# Patient Record
Sex: MALE | Race: WHITE | NOT HISPANIC OR LATINO | URBAN - METROPOLITAN AREA
[De-identification: names, ages, dates, MRNs, and addresses within clinical notes are randomized per-mention and may not be internally consistent; named-entity substitution may affect disease eponyms.]

---

## 2017-03-23 ENCOUNTER — OFFICE VISIT (OUTPATIENT)
Dept: FAMILY MEDICINE | Facility: CLINIC | Age: 22
End: 2017-03-23
Payer: COMMERCIAL

## 2017-03-23 VITALS
WEIGHT: 203.2 LBS | SYSTOLIC BLOOD PRESSURE: 140 MMHG | HEART RATE: 87 BPM | BODY MASS INDEX: 26.08 KG/M2 | TEMPERATURE: 97.5 F | HEIGHT: 74 IN | DIASTOLIC BLOOD PRESSURE: 93 MMHG | OXYGEN SATURATION: 98 %

## 2017-03-23 DIAGNOSIS — N34.1 NONGONOCOCCAL URETHRITIS DUE TO UREAPLASMA UREALYTICUM: ICD-10-CM

## 2017-03-23 DIAGNOSIS — F41.1 GAD (GENERALIZED ANXIETY DISORDER): Primary | ICD-10-CM

## 2017-03-23 DIAGNOSIS — A49.3 NONGONOCOCCAL URETHRITIS DUE TO UREAPLASMA UREALYTICUM: ICD-10-CM

## 2017-03-23 DIAGNOSIS — R30.0 DYSURIA: ICD-10-CM

## 2017-03-23 DIAGNOSIS — M54.2 NECK PAIN ON RIGHT SIDE: ICD-10-CM

## 2017-03-23 LAB
ALBUMIN UR-MCNC: ABNORMAL MG/DL
AMORPH CRY #/AREA URNS HPF: ABNORMAL /HPF
APPEARANCE UR: CLEAR
BILIRUB UR QL STRIP: NEGATIVE
COLOR UR AUTO: YELLOW
GLUCOSE UR STRIP-MCNC: NEGATIVE MG/DL
HGB UR QL STRIP: NEGATIVE
KETONES UR STRIP-MCNC: NEGATIVE MG/DL
LEUKOCYTE ESTERASE UR QL STRIP: NEGATIVE
NITRATE UR QL: NEGATIVE
PH UR STRIP: 7.5 PH (ref 5–7)
RBC #/AREA URNS AUTO: ABNORMAL /HPF (ref 0–2)
SP GR UR STRIP: 1.01 (ref 1–1.03)
URN SPEC COLLECT METH UR: ABNORMAL
UROBILINOGEN UR STRIP-ACNC: 0.2 EU/DL (ref 0.2–1)
WBC #/AREA URNS AUTO: ABNORMAL /HPF (ref 0–2)

## 2017-03-23 PROCEDURE — 87109 MYCOPLASMA: CPT | Performed by: PHYSICIAN ASSISTANT

## 2017-03-23 PROCEDURE — 99214 OFFICE O/P EST MOD 30 MIN: CPT | Performed by: PHYSICIAN ASSISTANT

## 2017-03-23 PROCEDURE — 81001 URINALYSIS AUTO W/SCOPE: CPT | Performed by: PHYSICIAN ASSISTANT

## 2017-03-23 NOTE — MR AVS SNAPSHOT
After Visit Summary   3/23/2017    Oni Anton    MRN: [id number]           Patient Information     Date Of Birth          1995        Visit Information        Provider Department      3/23/2017 12:00 PM Deidra Rowley [place]-C [facility] [place]        Today's Diagnoses     Dysuria    -  1    Neck pain on right side          Care Instructions    Go home and think about whether you want to start medication  If you decide to, request a mychart visit  Return to clinic for any new or worsening symptoms or go to ER Urgent care in off hours          Follow-ups after your visit        Additional Services     AMY PT, HAND, AND CHIROPRACTIC REFERRAL       **This order will print in the [facility] Scheduling Office**    Physical Therapy, Hand Therapy and Chiropractic Care are available through:    *[facility]  *[facility]  *[facility]    Call one number to schedule at any of the above locations: [phone].    Your provider has referred you to: Physical Therapy at [facility] or [facility]    Indication/Reason for Referral: neck and shoulder pain of right side  Onset of Illness: several months  Therapy Orders: Evaluate and Treat  Special Programs: None  Special Request: None    Nabeel Lees      Additional Comments for the Therapist or Chiropractor:     Please be aware that coverage of these services is subject to the terms and limitations of your health insurance plan.  Call member services at your health plan with any benefit or coverage questions.      Please bring the following to your appointment:    *Your personal calendar for scheduling future appointments  *Comfortable clothing                  Who to contact     If you have questions or need follow up information about today's clinic visit or your schedule please contact [facility] directly at [phone].  Normal or non-critical lab and imaging results will be  "communicated to you by Epiclisthart, letter or phone within 4 business days after the clinic has received the results. If you do not hear from us within 7 days, please contact the clinic through Typeform or phone. If you have a critical or abnormal lab result, we will notify you by phone as soon as possible.  Submit refill requests through Typeform or call your pharmacy and they will forward the refill request to us. Please allow 3 business days for your refill to be completed.          Additional Information About Your Visit        Typeform Information     Typeform lets you send messages to your doctor, view your test results, renew your prescriptions, schedule appointments and more. To sign up, go to [email or web address] . Click on \"Log in\" on the left side of the screen, which will take you to the Welcome page. Then click on \"Sign up Now\" on the right side of the page.     You will be asked to enter the access code listed below, as well as some personal information. Please follow the directions to create your username and password.     Your access code is: [email or web address]  Expires: 2017 12:31 PM     Your access code will  in 90 days. If you need help or a new code, please call your [place] clinic or [phone].        Care EveryWhere ID     This is your Care EveryWhere ID. This could be used by other organizations to access your [place] medical records  [id number]        Your Vitals Were     Pulse Temperature Height Pulse Oximetry BMI (Body Mass Index)       87 97.5  F (36.4  C) (Oral) 6' 2\" (1.88 m) 98% 26.09 kg/m2        Blood Pressure from Last 3 Encounters:   17 (!) 140/93   16 134/84   06/24/15 112/82    Weight from Last 3 Encounters:   17 203 lb 3.2 oz (92.2 kg)   16 204 lb (92.5 kg)   06/24/15 180 lb (81.6 kg)              We Performed the Following     AMY PT, HAND, AND CHIROPRACTIC REFERRAL     UA with Microscopic reflex to Culture     Ureaplasma culture        Primary " Care Provider Office Phone # Fax #    Blanca Anguiano [phone]       [facility] [address]  [facility] [address]        Thank you!     Thank you for choosing [facility]  for your care. Our goal is always to provide you with excellent care. Hearing back from our patients is one way we can continue to improve our services. Please take a few minutes to complete the written survey that you may receive in the mail after your visit with us. Thank you!             Your Updated Medication List - Protect others around you: Learn how to safely use, store and throw away your medicines at www.disposemymeds.org.          This list is accurate as of: 3/23/17 12:36 PM.  Always use your most recent med list.                   Brand Name Dispense Instructions for use    doxycycline 100 MG capsule    VIBRAMYCIN    20 capsule    Take 1 capsule (100 mg) by mouth 2 times daily

## 2017-03-23 NOTE — PATIENT INSTRUCTIONS
Go home and think about whether you want to start medication  If you decide to, request a mychart visit  Return to clinic for any new or worsening symptoms or go to ER Urgent care in off hours

## 2017-03-23 NOTE — NURSING NOTE
"Chief Complaint   Patient presents with     Infection       Initial BP (!) 140/93  Pulse 87  Temp 97.5  F (36.4  C) (Oral)  Ht 6' 2\" (1.88 m)  Wt 203 lb 3.2 oz (92.2 kg)  SpO2 98%  BMI 26.09 kg/m2 Estimated body mass index is 26.09 kg/(m^2) as calculated from the following:    Height as of this encounter: 6' 2\" (1.88 m).    Weight as of this encounter: 203 lb 3.2 oz (92.2 kg).  Medication Reconciliation: complete     Nery Rascon MA      "

## 2017-03-24 ASSESSMENT — PATIENT HEALTH QUESTIONNAIRE - PHQ9: SUM OF ALL RESPONSES TO PHQ QUESTIONS 1-9: 17

## 2017-03-27 LAB
BACTERIA SPEC CULT: ABNORMAL
MICRO REPORT STATUS: ABNORMAL
SPECIMEN SOURCE: ABNORMAL

## 2017-03-27 RX ORDER — DOXYCYCLINE 100 MG/1
100 CAPSULE ORAL 2 TIMES DAILY
Qty: 20 CAPSULE | Refills: 0 | Status: SHIPPED | OUTPATIENT
Start: 2017-03-27 | End: 2017-11-30

## 2017-07-07 ENCOUNTER — TELEPHONE (OUTPATIENT)
Dept: FAMILY MEDICINE | Facility: CLINIC | Age: 22
End: 2017-07-07

## 2017-07-07 DIAGNOSIS — R30.0 DYSURIA: Primary | ICD-10-CM

## 2017-07-07 NOTE — TELEPHONE ENCOUNTER
Pt is having symptoms again and requesting for a lab only for ureaplasma. Please advise.     Thank You,    Central Scheduler  Ramona TODD

## 2017-07-21 NOTE — TELEPHONE ENCOUNTER
Called patient regarding message from below. Patient states he was seen at [facility] for his symptoms and was given Doxycycline. He does not know what his lab results are but will call [facility] and have them give him results and he will request records to be sent to [facility]. Labs that were ordered previously not needed anymore.    [facility] TELLO.    Spring Lopez MA

## 2017-08-02 DIAGNOSIS — R30.0 DYSURIA: ICD-10-CM

## 2017-08-02 LAB
ALBUMIN UR-MCNC: NEGATIVE MG/DL
APPEARANCE UR: CLEAR
BILIRUB UR QL STRIP: NEGATIVE
COLOR UR AUTO: YELLOW
GLUCOSE UR STRIP-MCNC: NEGATIVE MG/DL
HGB UR QL STRIP: NEGATIVE
KETONES UR STRIP-MCNC: NEGATIVE MG/DL
LEUKOCYTE ESTERASE UR QL STRIP: NEGATIVE
NITRATE UR QL: NEGATIVE
PH UR STRIP: 7.5 PH (ref 5–7)
SP GR UR STRIP: 1.01 (ref 1–1.03)
URN SPEC COLLECT METH UR: ABNORMAL
UROBILINOGEN UR STRIP-ACNC: 0.2 EU/DL (ref 0.2–1)

## 2017-08-02 PROCEDURE — 87109 MYCOPLASMA: CPT | Performed by: PHYSICIAN ASSISTANT

## 2017-08-02 PROCEDURE — 81003 URINALYSIS AUTO W/O SCOPE: CPT | Performed by: PHYSICIAN ASSISTANT

## 2017-08-09 LAB
BACTERIA SPEC CULT: NORMAL
Lab: NORMAL
MICRO REPORT STATUS: NORMAL
SPECIMEN SOURCE: NORMAL

## 2017-09-05 ENCOUNTER — OFFICE VISIT (OUTPATIENT)
Dept: FAMILY MEDICINE | Facility: CLINIC | Age: 22
End: 2017-09-05
Payer: COMMERCIAL

## 2017-09-05 VITALS
OXYGEN SATURATION: 99 % | BODY MASS INDEX: 26.91 KG/M2 | HEART RATE: 94 BPM | TEMPERATURE: 99 F | DIASTOLIC BLOOD PRESSURE: 70 MMHG | WEIGHT: 209.6 LBS | SYSTOLIC BLOOD PRESSURE: 132 MMHG

## 2017-09-05 DIAGNOSIS — D64.9 ANEMIA, UNSPECIFIED TYPE: ICD-10-CM

## 2017-09-05 DIAGNOSIS — K59.00 CONSTIPATION, UNSPECIFIED CONSTIPATION TYPE: ICD-10-CM

## 2017-09-05 DIAGNOSIS — N52.9 ERECTILE DYSFUNCTION, UNSPECIFIED ERECTILE DYSFUNCTION TYPE: ICD-10-CM

## 2017-09-05 DIAGNOSIS — R30.0 DYSURIA: ICD-10-CM

## 2017-09-05 DIAGNOSIS — F33.1 MAJOR DEPRESSIVE DISORDER, RECURRENT EPISODE, MODERATE (H): Primary | ICD-10-CM

## 2017-09-05 LAB
ALBUMIN UR-MCNC: NEGATIVE MG/DL
APPEARANCE UR: CLEAR
BILIRUB UR QL STRIP: NEGATIVE
COLOR UR AUTO: YELLOW
ERYTHROCYTE [DISTWIDTH] IN BLOOD BY AUTOMATED COUNT: 13.3 % (ref 10–15)
FERRITIN SERPL-MCNC: 51 NG/ML (ref 26–388)
GLUCOSE UR STRIP-MCNC: NEGATIVE MG/DL
HCT VFR BLD AUTO: 44.9 % (ref 40–53)
HGB BLD-MCNC: 14.7 G/DL (ref 13.3–17.7)
HGB UR QL STRIP: NEGATIVE
KETONES UR STRIP-MCNC: NEGATIVE MG/DL
LEUKOCYTE ESTERASE UR QL STRIP: NEGATIVE
MCH RBC QN AUTO: 28.8 PG (ref 26.5–33)
MCHC RBC AUTO-ENTMCNC: 32.7 G/DL (ref 31.5–36.5)
MCV RBC AUTO: 88 FL (ref 78–100)
NITRATE UR QL: NEGATIVE
PH UR STRIP: 8 PH (ref 5–7)
PLATELET # BLD AUTO: 221 10E9/L (ref 150–450)
RBC # BLD AUTO: 5.11 10E12/L (ref 4.4–5.9)
SOURCE: ABNORMAL
SP GR UR STRIP: 1.01 (ref 1–1.03)
UROBILINOGEN UR STRIP-ACNC: 0.2 EU/DL (ref 0.2–1)
WBC # BLD AUTO: 6.1 10E9/L (ref 4–11)

## 2017-09-05 PROCEDURE — 36415 COLL VENOUS BLD VENIPUNCTURE: CPT | Performed by: PHYSICIAN ASSISTANT

## 2017-09-05 PROCEDURE — 80048 BASIC METABOLIC PNL TOTAL CA: CPT | Performed by: PHYSICIAN ASSISTANT

## 2017-09-05 PROCEDURE — 81003 URINALYSIS AUTO W/O SCOPE: CPT | Performed by: PHYSICIAN ASSISTANT

## 2017-09-05 PROCEDURE — 85027 COMPLETE CBC AUTOMATED: CPT | Performed by: PHYSICIAN ASSISTANT

## 2017-09-05 PROCEDURE — 99214 OFFICE O/P EST MOD 30 MIN: CPT | Performed by: PHYSICIAN ASSISTANT

## 2017-09-05 PROCEDURE — 82728 ASSAY OF FERRITIN: CPT | Performed by: PHYSICIAN ASSISTANT

## 2017-09-05 ASSESSMENT — PATIENT HEALTH QUESTIONNAIRE - PHQ9: SUM OF ALL RESPONSES TO PHQ QUESTIONS 1-9: 15

## 2017-09-05 NOTE — LETTER
My Depression Action Plan  Name: Oni Anton   Date of Birth 1995  Date: 9/5/2017    My doctor: Blanca Anguiano   My clinic: [address] [address] [phone]          GREEN    ZONE   Good Control    What it looks like:     Things are going generally well. You have normal up s and down s. You may even feel depressed from time to time, but bad moods usually last less than a day.   What you need to do:  1. Continue to care for yourself (see self care plan)  2. Check your depression survival kit and update it as needed  3. Follow your physician s recommendations including any medication.  4. Do not stop taking medication unless you consult with your physician first.           YELLOW         ZONE Getting Worse    What it looks like:     Depression is starting to interfere with your life.     It may be hard to get out of bed; you may be starting to isolate yourself from others.    Symptoms of depression are starting to last most all day and this has happened for several days.     You may have suicidal thoughts but they are not constant.   What you need to do:     1. Call your care team, your response to treatment will improve if you keep your care team informed of your progress. Yellow periods are signs an adjustment may need to be made.     2. Continue your self-care, even if you have to fake it!    3. Talk to someone in your support network    4. Open up your depression survival kit           RED    ZONE Medical Alert - Get Help    What it looks like:     Depression is seriously interfering with your life.     You may experience these or other symptoms: You can t get out of bed most days, can t work or engage in other necessary activities, you have trouble taking care of basic hygiene, or basic responsibilities, thoughts of suicide or death that will not go away, self-injurious behavior.     What you need to do:  1. Call your care  team and request a same-day appointment. If they are not available (weekends or after hours) call your local crisis line, emergency room or 911.      Electronically signed by: Nery Rascon, September 5, 2017    Depression Self Care Plan / Survival Kit    Self-Care for Depression  Here s the deal. Your body and mind are really not as separate as most people think.  What you do and think affects how you feel and how you feel influences what you do and think. This means if you do things that people who feel good do, it will help you feel better.  Sometimes this is all it takes.  There is also a place for medication and therapy depending on how severe your depression is, so be sure to consult with your medical provider and/ or Behavioral Health Consultant if your symptoms are worsening or not improving.     In order to better manage my stress, I will:    Exercise  Get some form of exercise, every day. This will help reduce pain and release endorphins, the  feel good  chemicals in your brain. This is almost as good as taking antidepressants!  This is not the same as joining a gym and then never going! (they count on that by the way ) It can be as simple as just going for a walk or doing some gardening, anything that will get you moving.      Hygiene   Maintain good hygiene (Get out of bed in the morning, Make your bed, Brush your teeth, Take a shower, and Get dressed like you were going to work, even if you are unemployed).  If your clothes don't fit try to get ones that do.    Diet  I will strive to eat foods that are good for me, drink plenty of water, and avoid excessive sugar, caffeine, alcohol, and other mood-altering substances.  Some foods that are helpful in depression are: complex carbohydrates, B vitamins, flaxseed, fish or fish oil, fresh fruits and vegetables.    Psychotherapy  I agree to participate in Individual Therapy (if recommended).    Medication  If prescribed medications, I agree to take them.   Missing doses can result in serious side effects.  I understand that drinking alcohol, or other illicit drug use, may cause potential side effects.  I will not stop my medication abruptly without first discussing it with my provider.    Staying Connected With Others  I will stay in touch with my friends, family members, and my primary care provider/team.    Use your imagination  Be creative.  We all have a creative side; it doesn t matter if it s oil painting, sand castles, or mud pies! This will also kick up the endorphins.    Witness Beauty  (AKA stop and smell the roses) Take a look outside, even in mid-winter. Notice colors, textures. Watch the squirrels and birds.     Service to others  Be of service to others.  There is always someone else in need.  By helping others we can  get out of ourselves  and remember the really important things.  This also provides opportunities for practicing all the other parts of the program.    Humor  Laugh and be silly!  Adjust your TV habits for less news and crime-drama and more comedy.    Control your stress  Try breathing deep, massage therapy, biofeedback, and meditation. Find time to relax each day.     My support system    Clinic Contact:  Phone number:    Contact 1:  Phone number:    Contact 2:  Phone number:    Jainism/:  Phone number:    Therapist:  Phone number:    Local crisis center:    Phone number:    Other community support:  Phone number:

## 2017-09-05 NOTE — NURSING NOTE
"Chief Complaint   Patient presents with     Depression     Anxiety       Initial /70  Pulse 94  Temp 99  F (37.2  C) (Oral)  Wt 209 lb 9.6 oz (95.1 kg)  SpO2 99%  BMI 26.91 kg/m2 Estimated body mass index is 26.91 kg/(m^2) as calculated from the following:    Height as of 3/23/17: 6' 2\" (1.88 m).    Weight as of this encounter: 209 lb 9.6 oz (95.1 kg).  Medication Reconciliation: complete     Nery Rascon MA      "

## 2017-09-05 NOTE — PROGRESS NOTES
"  SUBJECTIVE:   Oni Anton is a 21 year old male who presents to clinic today for the following health issues:    Depression and Anxiety Follow-Up    Status since last visit: No change    Other associated symptoms:None    Complicating factors:     Significant life event: No     Current substance abuse: None    PHQ-9 SCORE 5/4/2016 3/23/2017 9/5/2017   Total Score - - -   Total Score 17 17 15   Total Score - - -     ANAHY-7 SCORE 1/6/2015 2/17/2015 6/24/2015   Total Score 13 13 -   Total Score - 14 -   Total Score BEH Adult - - 14       PHQ-9  English  PHQ-9   Any Language  GAD7      Amount of exercise or physical activity: 2 days    Problems taking medications regularly: No    Medication side effects: none  Diet: gluten-free/reduced    Genitourinary - Male  Onset: \"a while\"    Description:   Dysuria (painful urination): YES mild  Hematuria (blood in urine): no, but has had blood in stool  Frequency: YES  Are you urinating at night : no   Hesitancy (delay in urine): no   Retention (unable to empty): YES  Decrease in urinary flow: no   Incontinence: no     Progression of Symptoms:  intermittent    Accompanying Signs & Symptoms:  Fever: no   Back/Flank pain: YES- was seen for it at a  clinic, unable to find anything. 1 month ago  Urethral discharge: no   Testicle lumps/masses/pain: no   Nausea and/or vomiting: no   Abdominal pain: no     History:   History of frequent UTI's: no   History of kidney stones: no   History of hernias: no   Personal or Family history of Prostate problems: no  Sexually active: YES    Precipitating factors:       Alleviating factors:      He tested positive for ureaplasma in march. We treated him with doxycyline (his girlfriend had gotten tested as well).  After this it seemed better. But after a while it got \"bad again.\" we retested in August and it was negative.     He didn't follow up with the GI specialist. Reports now the rectal bleeding has been more infrequent.     A few days ago " he had some loose stools which resolved after a few days. He denies history of IBS. Doesn't have loose stools regularly when he gets anxious.     Saw psychiatry. They recommended maybe doing a gluten free diet. He's been doing this for 3 months. Unsure if it's helped. He still experiences anxiety and depression. They gave him some beta blockers to help with anxiety attacks and it helped quite a bit    He was seen in July at [facility] for painful urination and had a low hemoglobin        Problem list and histories reviewed & adjusted, as indicated.  Additional history: as documented    ROS:  C: NEGATIVE for fever, chills, change in weight  I: NEGATIVE for worrisome rashes, moles or lesions  E: NEGATIVE for vision changes or irritation  E/M: NEGATIVE for ear, mouth and throat problems  R: NEGATIVE for significant cough or SOB  CV: NEGATIVE for chest pain, palpitations or peripheral edema  GI: NEGATIVE for hematemesis, hemorrhoids, Hx IBD, Hx IBS, jaundice, melena, nausea, poor appetite, vomiting and weight loss  M: NEGATIVE for significant arthralgias or myalgia  N: NEGATIVE for weakness, dizziness or paresthesias  E: NEGATIVE for temperature intolerance, skin/hair changes  H: NEGATIVE for bleeding problems  PSYCHIATRIC: NEGATIVE forthoughts of hurting someone else and thoughts of self harm    Patient Active Problem List   Diagnosis     CARDIOVASCULAR SCREENING; LDL GOAL LESS THAN 160     Attention deficit disorder     Major depressive disorder, recurrent episode, moderate (H)     Cervicalgia     Right-sided thoracic back pain     Episodic tension-type headache, not intractable     Right shoulder pain     ANAHY (generalized anxiety disorder)     Past Surgical History:   Procedure Laterality Date     C PE TUBE         Social History   Substance Use Topics     Smoking status: Never Smoker     Smokeless tobacco: Never Used     Alcohol use No     Family History   Problem Relation Age of Onset     DIABETES Paternal  Grandmother      Hypertension Paternal Grandfather      Lipids Paternal Grandfather      Myocardial Infarction Maternal Grandfather 47     Lipids Father      Depression Father            Labs reviewed in EPIC  BP Readings from Last 3 Encounters:   09/05/17 132/70   03/23/17 (!) 140/93   05/04/16 134/84    Wt Readings from Last 3 Encounters:   09/05/17 209 lb 9.6 oz (95.1 kg)   03/23/17 203 lb 3.2 oz (92.2 kg)   05/04/16 204 lb (92.5 kg)                      OBJECTIVE:                                                    /70  Pulse 94  Temp 99  F (37.2  C) (Oral)  Wt 209 lb 9.6 oz (95.1 kg)  SpO2 99%  BMI 26.91 kg/m2 Body mass index is 26.91 kg/(m^2).   GENERAL: healthy, alert, well nourished, well hydrated, no distress  EYES: Eyes grossly normal to inspection, extraocular movements - intact, and PERRL  HENT: ear canals- normal; TMs- normal; Nose- normal; Mouth- no ulcers, no lesions  NECK: no tenderness, no adenopathy, no asymmetry, no masses, no stiffness; thyroid- normal to palpation  RESP: lungs clear to auscultation - no rales, no rhonchi, no wheezes  CV: regular rates and rhythm, normal S1 S2, no S3 or S4 and no murmur, no click or rub -  ABDOMEN: soft, no tenderness, no  hepatosplenomegaly, no masses, normal bowel sounds  MS: extremities- no gross deformities noted, no edema  SKIN: no suspicious lesions, no rashes  NEURO: strength and tone- normal, sensory exam- grossly normal, mentation- intact, speech- normal, reflexes- symmetric  PSYCH: Alert and oriented times 3; speech- coherent , normal rate and volume; able to articulate logical thoughts, able to abstract reason, no tangential thoughts, no hallucinations or delusions, affect- normal    Results for orders placed or performed in visit on 09/05/17 (from the past 24 hour(s))   CBC with platelets   Result Value Ref Range    WBC 6.1 4.0 - 11.0 10e9/L    RBC Count 5.11 4.4 - 5.9 10e12/L    Hemoglobin 14.7 13.3 - 17.7 g/dL    Hematocrit 44.9 40.0 -  53.0 %    MCV 88 78 - 100 fl    MCH 28.8 26.5 - 33.0 pg    MCHC 32.7 31.5 - 36.5 g/dL    RDW 13.3 10.0 - 15.0 %    Platelet Count 221 150 - 450 10e9/L          ASSESSMENT/PLAN:                                                        ICD-10-CM    1. Major depressive disorder, recurrent episode, moderate (H) F33.1    2. Dysuria R30.0 *UA reflex to Microscopic     UROLOGY ADULT REFERRAL     Basic metabolic panel  (Ca, Cl, CO2, Creat, Gluc, K, Na, BUN)   3. Erectile dysfunction, unspecified erectile dysfunction type N52.9 *UA reflex to Microscopic     UROLOGY ADULT REFERRAL   4. Anemia, unspecified type D64.9 CBC with platelets     Ferritin   5. Constipation, unspecified constipation type K59.00      Depression and anxiety seem to be mostly stable. Encouraged him to start therapy. Follow up with urology for ongoing dysuria. May be from constipation so he was advised to start miralax daily. Follow up on low hemoglobin from urgent care visit. Check iron levels. May be from rectal bleeding. Advised to follow up with GI. Return to clinic for any new or worsening symptoms or go to ER Urgent care in off hours    Patient Instructions     Take miralax 1 cap in 8 ounces of water daily. Increase to 2-3 caps daily if needed.  Call to schedule with GI specialist  Labs today  Call to schedule with urology  Call to schedule with Clary Nugent [phone]       PROCEDURE ONLY - COLONOSCOPY - Reason for procedure: Rectal Bleeding  [facility]: Gastroenterology Clinic [facility] [phone]   [email or web address]   N: [place] [facility] (for [place] and [facility], please use the selections above) - [place] [phone]   [email or web address]   Rebecca Bates [phone]   [email or web address]   [place] [phone]   [email or web address]   [place] [phone]   [email or web address]   [place] [phone]   [email or web address]   [place] [phone]  [phone]   [email or web address]     Recheck with me in 3 months  Return to clinic for any new or worsening symptoms or go to ER Urgent care in off hours          Estimated body mass index is 26.91 kg/(m^2) as calculated from the following:    Height as of 3/23/17: 6' 2\" (1.88 m).    Weight as of this encounter: 209 lb 9.6 oz (95.1 kg).       Deidra Rowley  [facility]      "

## 2017-09-05 NOTE — PATIENT INSTRUCTIONS
Take miralax 1 cap in 8 ounces of water daily. Increase to 2-3 caps daily if needed.  Call to schedule with GI specialist  Labs today  Call to schedule with urology  Call to schedule with Clary Nugent [phone]       PROCEDURE ONLY - COLONOSCOPY - Reason for procedure: Rectal Bleeding  [facility]: Gastroenterology Clinic [facility] [phone]   [email or web address]   N: [place] [facility] (for [place] and [facility], please use the selections above) - [place] [phone]   [email or web address]   Rebecca Bates [phone]   [email or web address]   [place] [phone]   [email or web address]   [place] [phone]   [email or web address]   [place] [phone]   [email or web address]   [place] [phone]   [email or web address]     Recheck with me in 3 months  Return to clinic for any new or worsening symptoms or go to ER Urgent care in off hours

## 2017-09-05 NOTE — MR AVS SNAPSHOT
After Visit Summary   9/5/2017    Oni Anton    MRN: [id number]           Patient Information     Date Of Birth          1995        Visit Information        Provider Department      9/5/2017 1:00 PM Deidra Rowley PA-C [facility]        Today's Diagnoses     Major depressive disorder, recurrent episode, moderate (H)    -  1    Dysuria        Erectile dysfunction, unspecified erectile dysfunction type        Anemia, unspecified type          Care Instructions    Take miralax 1 cap in 8 ounces of water daily. Increase to 2-3 caps daily if needed.  Call to schedule with GI specialist  Labs today  Call to schedule with urology  Call to schedule with Clary Nugent [phone]       PROCEDURE ONLY - COLONOSCOPY - Reason for procedure: Rectal Bleeding  [facility] [phone]   [email or web address]   N: [place] [facility] (for [facility] and [facility], please use the selections above) - [place] [phone]   [email or web address]   [place] [phone]   [email or web address]   [place] [phone]   [email or web address]   [place] [phone]   [email or web address]   [place] [phone]   [email or web address]   [place] [phone]   [email or web address]     Recheck with me in 3 months  Return to clinic for any new or worsening symptoms or go to ER Urgent care in off hours            Follow-ups after your visit        Additional Services     UROLOGY ADULT REFERRAL       Your provider has referred you to: [facility]: [facility] [place] [phone]   [email or web address]  [facility]: [facility] [place] [phone]   [email or web address]  UMP: [place] [facility] [phone]    [email or web address]  N: [facility].  [place] [phone]   [email or web address]  [place] [phone]   [email or web address]    Please be aware that coverage of these services is subject to the terms and limitations of your health insurance plan.  Call member services at your health plan with any benefit or coverage questions.      Please bring the following with you to your appointment:    (1) Any X-Rays, CTs or MRIs which have been performed.  Contact the facility where they were done to arrange for  prior to your scheduled appointment.    (2) List of current medications  (3) This referral request   (4) Any documents/labs given to you for this referral                  Who to contact     If you have questions or need follow up information about today's clinic visit or your schedule please contact [facility] directly at [phone].  Normal or non-critical lab and imaging results will be communicated to you by MyChart, letter or phone within 4 business days after the clinic has received the results. If you do not hear from us within 7 days, please contact the clinic through phorushart or phone. If you have a critical or abnormal lab result, we will notify you by phone as soon as possible.  Submit refill requests through N-of-One or call your pharmacy and they will forward the refill request to us. Please allow 3 business days for your refill to be completed.          Additional Information About Your Visit        phorushart Information     N-of-One gives you secure access to your electronic health record. If you see a primary care provider, you can also send messages to your care team and make appointments. If you have questions, please call your primary care clinic.  If you do not have a primary care provider, please call [phone] and they will assist you.        Care EveryWhere ID     This is your Care  EveryWhere ID. This could be used by other organizations to access your [place] medical records  [id number]        Your Vitals Were     Pulse Temperature Pulse Oximetry BMI (Body Mass Index)          94 99  F (37.2  C) (Oral) 99% 26.91 kg/m2         Blood Pressure from Last 3 Encounters:   09/05/17 132/70   03/23/17 (!) 140/93   05/04/16 134/84    Weight from Last 3 Encounters:   09/05/17 209 lb 9.6 oz (95.1 kg)   03/23/17 203 lb 3.2 oz (92.2 kg)   05/04/16 204 lb (92.5 kg)              We Performed the Following     *UA reflex to Microscopic     Basic metabolic panel  (Ca, Cl, CO2, Creat, Gluc, K, Na, BUN)     CBC with platelets     DEPRESSION ACTION PLAN (DAP)     Ferritin     UROLOGY ADULT REFERRAL        Primary Care Provider Office Phone # Fax #    Blanca Anguiano [phone] [address] [address] [facility] [address]        Equal Access to Services     MARIA ELENA PARK : Hadii aad ku hadasho Soomaali, waaxda luqadaha, qaybta kaalmada adeegyada, waxay idiin haycedric morrissey . So [facility] [phone].    ATENCIÓN: Si habla español, tiene a romero disposición servicios gratuitos de asistencia lingüística. Llame al [phone].    We comply with applicable federal civil rights laws and [place] laws. We do not discriminate on the basis of race, color, national origin, age, disability sex, sexual orientation or gender identity.            Thank you!     Thank you for choosing [facility]  for your [place]. Our goal is always to provide you with excellent care. Hearing back from our patients is one way we can continue to improve our services. Please take a few minutes to complete the written survey that you may receive in the mail after your visit with us. Thank you!             Your Updated Medication List - Protect others around you: Learn how to safely use, store and throw away your medicines at www.disposemymeds.org.          This list is accurate as of: 9/5/17  1:27 PM.   Always use your most recent med list.                   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 Read the directions carefully, and ask your doctor or other care provider to review them with you.

## 2017-09-06 LAB
ANION GAP SERPL CALCULATED.3IONS-SCNC: 11 MMOL/L (ref 3–14)
BUN SERPL-MCNC: 10 MG/DL (ref 7–30)
CALCIUM SERPL-MCNC: 9.7 MG/DL (ref 8.5–10.1)
CHLORIDE SERPL-SCNC: 105 MMOL/L (ref 94–109)
CO2 SERPL-SCNC: 25 MMOL/L (ref 20–32)
CREAT SERPL-MCNC: 1.03 MG/DL (ref 0.66–1.25)
GFR SERPL CREATININE-BSD FRML MDRD: 90 ML/MIN/1.7M2
GLUCOSE SERPL-MCNC: 89 MG/DL (ref 70–99)
POTASSIUM SERPL-SCNC: 4.3 MMOL/L (ref 3.4–5.3)
SODIUM SERPL-SCNC: 141 MMOL/L (ref 133–144)

## 2017-09-07 ENCOUNTER — PRE VISIT (OUTPATIENT)
Dept: UROLOGY | Facility: CLINIC | Age: 22
End: 2017-09-07

## 2017-09-25 ENCOUNTER — PRE VISIT (OUTPATIENT)
Dept: UROLOGY | Facility: CLINIC | Age: 22
End: 2017-09-25

## 2017-11-30 ENCOUNTER — OFFICE VISIT (OUTPATIENT)
Dept: FAMILY MEDICINE | Facility: CLINIC | Age: 22
End: 2017-11-30
Payer: COMMERCIAL

## 2017-11-30 ENCOUNTER — TELEPHONE (OUTPATIENT)
Dept: FAMILY MEDICINE | Facility: CLINIC | Age: 22
End: 2017-11-30

## 2017-11-30 VITALS
WEIGHT: 218.1 LBS | HEART RATE: 85 BPM | TEMPERATURE: 97 F | DIASTOLIC BLOOD PRESSURE: 80 MMHG | SYSTOLIC BLOOD PRESSURE: 140 MMHG | OXYGEN SATURATION: 99 % | BODY MASS INDEX: 28 KG/M2

## 2017-11-30 DIAGNOSIS — K60.2 ANAL FISSURE: ICD-10-CM

## 2017-11-30 DIAGNOSIS — Z23 NEED FOR PROPHYLACTIC VACCINATION AND INOCULATION AGAINST INFLUENZA: ICD-10-CM

## 2017-11-30 DIAGNOSIS — F33.1 MAJOR DEPRESSIVE DISORDER, RECURRENT EPISODE, MODERATE (H): ICD-10-CM

## 2017-11-30 DIAGNOSIS — F41.1 GAD (GENERALIZED ANXIETY DISORDER): ICD-10-CM

## 2017-11-30 DIAGNOSIS — K59.09 CHRONIC CONSTIPATION: Primary | ICD-10-CM

## 2017-11-30 PROCEDURE — 99214 OFFICE O/P EST MOD 30 MIN: CPT | Mod: 25 | Performed by: PHYSICIAN ASSISTANT

## 2017-11-30 PROCEDURE — 90686 IIV4 VACC NO PRSV 0.5 ML IM: CPT | Performed by: PHYSICIAN ASSISTANT

## 2017-11-30 PROCEDURE — 90471 IMMUNIZATION ADMIN: CPT | Performed by: PHYSICIAN ASSISTANT

## 2017-11-30 RX ORDER — ASPIRIN 81 MG
100 TABLET, DELAYED RELEASE (ENTERIC COATED) ORAL DAILY
Qty: 60 TABLET | Refills: 1 | Status: SHIPPED | OUTPATIENT
Start: 2017-11-30 | End: 2018-11-23

## 2017-11-30 NOTE — TELEPHONE ENCOUNTER
[facility] called diltiazem 2% in PLO cream, FV COMPOUNDED, 2% GEL - local print - requested rx to be faxed to pharmacy    Sabrina Giraldo RN

## 2017-11-30 NOTE — TELEPHONE ENCOUNTER
Routing to provider - [place] - please review and advise as appropriate  1. Script placed on provider desk for signature    Thank you,  Sabrina Giraldo RN

## 2017-11-30 NOTE — PATIENT INSTRUCTIONS
Follow up with GI  Follow up with psychiatrist   Try colace as directed  If no improvement, try magnesium 500-1000 mg daily  Return to clinic for any new or worsening symptoms or go to ER Urgent care in off hours

## 2017-11-30 NOTE — PROGRESS NOTES

## 2017-11-30 NOTE — PROGRESS NOTES
SUBJECTIVE:   Oni Anton is a 22 year old male who presents to clinic today for the following health issues:    Medication Followup of Laxatives    Taking Medication as prescribed: not applicable, OTC medication    Side Effects:  None    Medication Helping Symptoms:  Yes - temporary only     Saw a neurologist, did not help  Pain in urethra as well, would like to discuss options   Would like a flu shot as well    Urology didn't do anything at that visit, said to return if not better in a few months and they could do further testing    Had been taking miralax and reports he felt like he was bloating a lot when he was on it. Bowel movements were not painful. He just couldn't handle having stomach pains with the bowel movements that the miralax caused. Now bowel movements are painful again. He didn't follow up with GI     He does wonder if there's a dietary thing that he's allergic to    Admits to feeling quite a bit of anxiety lately. Reports he thinks it's a large part of the physical symptoms he's felt  He's been to therapy. Feels he can sort through rational versus irrational symptoms  He's been on antidepressants and didn't fele like they worked well. Felt more zombie like        Problem list and histories reviewed & adjusted, as indicated.  Additional history: as documented    ROS:  C: NEGATIVE for fever, chills, change in weight  I: NEGATIVE for worrisome rashes, moles or lesions  E: NEGATIVE for vision changes or irritation  E/M: NEGATIVE for ear, mouth and throat problems  R: NEGATIVE for significant cough or SOB  CV: NEGATIVE for chest pain, palpitations or peripheral edema  GI: NEGATIVE for diarrhea, heartburn or reflux, hematemesis, hemorrhoids, Hx IBD, Hx IBS, jaundice, melena, vomiting and weight loss   male :positive for, decreased urinary stream, dysparunia, erectile dysfunction, hematuria and incontinence  M: NEGATIVE for significant arthralgias or myalgia  N: NEGATIVE for weakness, dizziness  or paresthesias  E: NEGATIVE for temperature intolerance, skin/hair changes  H: NEGATIVE for bleeding problems  PSYCHIATRIC: NEGATIVE forthoughts of hurting someone else and thoughts of self harm    Patient Active Problem List   Diagnosis     CARDIOVASCULAR SCREENING; LDL GOAL LESS THAN 160     Attention deficit disorder     Major depressive disorder, recurrent episode, moderate (H)     Cervicalgia     Right-sided thoracic back pain     Episodic tension-type headache, not intractable     Right shoulder pain     ANAHY (generalized anxiety disorder)     Past Surgical History:   Procedure Laterality Date     C PE TUBE         Social History   Substance Use Topics     Smoking status: Never Smoker     Smokeless tobacco: Never Used     Alcohol use No     Family History   Problem Relation Age of Onset     DIABETES Paternal Grandmother      Hypertension Paternal Grandfather      Lipids Paternal Grandfather      Myocardial Infarction Maternal Grandfather 47     Lipids Father      Depression Father            Labs reviewed in EPIC  BP Readings from Last 3 Encounters:   11/30/17 140/80   09/05/17 132/70   03/23/17 (!) 140/93    Wt Readings from Last 3 Encounters:   11/30/17 218 lb 1.6 oz (98.9 kg)   09/05/17 209 lb 9.6 oz (95.1 kg)   03/23/17 203 lb 3.2 oz (92.2 kg)                      OBJECTIVE:                                                    /80  Pulse 85  Temp 97  F (36.1  C) (Oral)  Wt 218 lb 1.6 oz (98.9 kg)  SpO2 99%  BMI 28 kg/m2 Body mass index is 28 kg/(m^2).   GENERAL: healthy, alert, well nourished, well hydrated, no distress  EYES: Eyes grossly normal to inspection, extraocular movements - intact, and PERRL  HENT: ear canals- normal; TMs- normal; Nose- normal; Mouth- no ulcers, no lesions  NECK: no tenderness, no adenopathy, no asymmetry, no masses, no stiffness; thyroid- normal to palpation  RESP: lungs clear to auscultation - no rales, no rhonchi, no wheezes  CV: regular rates and rhythm, normal S1  S2, no S3 or S4 and no murmur, no click or rub -  ABDOMEN: soft, no tenderness, no  hepatosplenomegaly, no masses, normal bowel sounds  MS: extremities- no gross deformities noted, no edema  SKIN: no suspicious lesions, no rashes  NEURO: strength and tone- normal, sensory exam- grossly normal, mentation- intact, speech- normal, reflexes- symmetric  RECTAL- male: no masses, no hemorhoids, anal fissure at 6 o clock position  PSYCH: Alert and oriented times 3; speech- coherent , normal rate and volume; able to articulate logical thoughts, able to abstract reason, no tangential thoughts, no hallucinations or delusions, affect- normal    No results found for this or any previous visit (from the past 24 hour(s)).       ASSESSMENT/PLAN:                                                        ICD-10-CM    1. Chronic constipation K59.09 GASTROENTEROLOGY ADULT REF CONSULT ONLY     docusate sodium (COLACE) 100 MG tablet   2. ANAHY (generalized anxiety disorder) F41.1 MENTAL HEALTH REFERRAL  - Adult; Psychiatry and Medication Management; Psychiatry; [facility] [place], [place] [phone]  Medication management & future refills will be returned to [facility] PCP upon compl...   3. Major depressive disorder, recurrent episode, moderate (H) F33.1 MENTAL HEALTH REFERRAL  - Adult; Psychiatry and Medication Management; Psychiatry; [facility] [place], [place] [phone]  Medication management & future refills will be returned to [facility] PCP upon compl...   4. Anal fissure K60.2 diltiazem 2% in PLO cream, FV COMPOUNDED, 2% GEL     lidocaine, viscous, (XYLOCAINE) 2 % solution   5. Need for prophylactic vaccination and inoculation against influenza Z23 FLU VAC, SPLIT VIRUS IM > 3 YO (QUADRIVALENT) [96059]     Vaccine Administration, Initial [76236]     Follow up with GI to figure out GI issues. For now treat rectal fissure with diltiazem cream and keep stools  "soft with colace and/or miralax. Depression and anxiety not well controlled. He doesn't feel the mediations have worked and may be interested in genesight testing. Follow up with psychiatrist for further evaluation. Return to clinic for any new or worsening symptoms or go to ER Urgent care in off hours    Patient Instructions   Follow up with GI  Follow up with psychiatrist   Try colace as directed  If no improvement, try magnesium 500-1000 mg daily  Return to clinic for any new or worsening symptoms or go to ER Urgent care in off hours          Estimated body mass index is 28 kg/(m^2) as calculated from the following:    Height as of 3/23/17: 6' 2\" (1.88 m).    Weight as of this encounter: 218 lb 1.6 oz (98.9 kg).       Deidra Tirado [facility]  [facility]    "

## 2017-11-30 NOTE — MR AVS SNAPSHOT
After Visit Summary   11/30/2017    Oni Anton    MRN: [id number]           Patient Information     Date Of Birth          1995        Visit Information        Provider Department      11/30/2017 10:40 AM Deidra Rowley PA-C [facility]        Today's Diagnoses     Need for prophylactic vaccination and inoculation against influenza    -  1    Chronic constipation        ANAHY (generalized anxiety disorder)        Major depressive disorder, recurrent episode, moderate (H)          Care Instructions    Follow up with GI  Follow up with psychiatrist   Try colace as directed  If no improvement, try magnesium 500-1000 mg daily  Return to clinic for any new or worsening symptoms or go to ER Urgent care in off hours            Follow-ups after your visit        Additional Services     GASTROENTEROLOGY ADULT REF CONSULT ONLY       Preferred Location: [place] [facility] [phone]      Please be aware that coverage of these services is subject to the terms and limitations of your health insurance plan.  Call member services at your health plan with any benefit or coverage questions.  Any procedures must be performed at a [place] facility OR coordinated by your clinic's referral office.    Please bring the following with you to your appointment:    (1) Any X-Rays, CTs or MRIs which have been performed.  Contact the facility where they were done to arrange for  prior to your scheduled appointment.    (2) List of current medications   (3) This referral request   (4) Any documents/labs given to you for this referral            MENTAL HEALTH REFERRAL  - Adult; Psychiatry and Medication Management; Psychiatry; [facility]: [facility]   [place], [place] [phone]  Medication management & future refills will be returned to [facility] PCP upon compl...       All scheduling is subject to the client's specific insurance plan & benefits, provider/location  availability, and provider clinical specialities.  Please arrive 15 minutes early for your first appointment and bring your completed paperwork.    Please be aware that coverage of these services is subject to the terms and limitations of your health insurance plan.  Call member services at your health plan with any benefit or coverage questions.                            Who to contact     If you have questions or need follow up information about today's clinic visit or your schedule please contact [facility] directly at [phone].  Normal or non-critical lab and imaging results will be communicated to you by VisionGatehart, letter or phone within 4 business days after the clinic has received the results. If you do not hear from us within 7 days, please contact the clinic through Talend or phone. If you have a critical or abnormal lab result, we will notify you by phone as soon as possible.  Submit refill requests through Talend or call your pharmacy and they will forward the refill request to us. Please allow 3 business days for your refill to be completed.          Additional Information About Your Visit        Talend Information     Talend gives you secure access to your electronic health record. If you see a primary care provider, you can also send messages to your care team and make appointments. If you have questions, please call your primary care clinic.  If you do not have a primary care provider, please call [phone] and they will assist you.        Care EveryWhere ID     This is your Care EveryWhere ID. This could be used by other organizations to access your [place] medical records  [id number]        Your Vitals Were     Pulse Temperature Pulse Oximetry BMI (Body Mass Index)          85 97  F (36.1  C) (Oral) 99% 28 kg/m2         Blood Pressure from Last 3 Encounters:   11/30/17 140/80   09/05/17 132/70   03/23/17 (!) 140/93    Weight from Last 3 Encounters:   11/30/17 218 lb 1.6  oz (98.9 kg)   09/05/17 209 lb 9.6 oz (95.1 kg)   03/23/17 203 lb 3.2 oz (92.2 kg)              We Performed the Following     FLU VAC, SPLIT VIRUS IM > 3 YO (QUADRIVALENT) [82901]     GASTROENTEROLOGY ADULT REF CONSULT ONLY     MENTAL HEALTH REFERRAL  - Adult; Psychiatry and Medication Management; Psychiatry; [facility]: [facility]   [place], [place] [phone]  Medication management & future refills will be returned to [facility] PCP upon compl...     Vaccine Administration, Initial [78068]          Today's Medication Changes          These changes are accurate as of: 11/30/17 11:12 AM.  If you have any questions, ask your nurse or doctor.               Start taking these medicines.        Dose/Directions    docusate sodium 100 MG tablet   Commonly known as:  COLACE   Used for:  Chronic constipation        Dose:  100 mg   Take 100 mg by mouth daily   Quantity:  60 tablet   Refills:  1         Stop taking these medicines if you haven't already. Please contact your care team if you have questions.     doxycycline 100 MG capsule   Commonly known as:  VIBRAMYCIN                Where to get your medicines      These medications were sent to [facility] [place], [place] - [address]  [address] [address]     Phone:  [phone]     docusate sodium 100 MG tablet                Primary Care Provider Office Phone # Fax #    Blanca Anguiano [phone] [address] [facility] [address]        Equal Access to Services     IVAN PARK AH: Hadii aad ku hadasho Soomaali, waaxda luqadaha, qaybta kaalmada adeegyada, shamir etienne. So [facility] [phone].    ATENCIÓN: Si habla español, tiene a romero disposición servicios gratuitos de asistencia lingüística. Llame al [phone].    We comply with applicable federal civil rights laws and [place] laws. We do not discriminate on the basis of race, color, national  origin, age, disability, sex, sexual orientation, or gender identity.            Thank you!     Thank you for choosing [facility]  for your [place]. Our goal is always to provide you with excellent care. Hearing back from our patients is one way we can continue to improve our services. Please take a few minutes to complete the written survey that you may receive in the mail after your visit with us. Thank you!             Your Updated Medication List - Protect others around you: Learn how to safely use, store and throw away your medicines at www.disposemymeds.org.          This list is accurate as of: 11/30/17 11:12 AM.  Always use your most recent med list.                   Brand Name Dispense Instructions for use Diagnosis    docusate sodium 100 MG tablet    COLACE    60 tablet    Take 100 mg by mouth daily    Chronic constipation

## 2018-01-08 ENCOUNTER — TRANSFERRED RECORDS (OUTPATIENT)
Dept: HEALTH INFORMATION MANAGEMENT | Facility: CLINIC | Age: 23
End: 2018-01-08

## 2018-11-23 ENCOUNTER — OFFICE VISIT (OUTPATIENT)
Dept: FAMILY MEDICINE | Facility: CLINIC | Age: 23
End: 2018-11-23
Payer: COMMERCIAL

## 2018-11-23 VITALS
TEMPERATURE: 97.9 F | HEART RATE: 75 BPM | BODY MASS INDEX: 27.33 KG/M2 | RESPIRATION RATE: 12 BRPM | SYSTOLIC BLOOD PRESSURE: 138 MMHG | OXYGEN SATURATION: 96 % | DIASTOLIC BLOOD PRESSURE: 88 MMHG | WEIGHT: 219.8 LBS | HEIGHT: 75 IN

## 2018-11-23 DIAGNOSIS — K58.9 IRRITABLE BOWEL SYNDROME, UNSPECIFIED TYPE: ICD-10-CM

## 2018-11-23 DIAGNOSIS — Z13.220 LIPID SCREENING: ICD-10-CM

## 2018-11-23 DIAGNOSIS — F33.1 MAJOR DEPRESSIVE DISORDER, RECURRENT EPISODE, MODERATE (H): ICD-10-CM

## 2018-11-23 DIAGNOSIS — Z00.00 WELL ADULT EXAM: Primary | ICD-10-CM

## 2018-11-23 LAB
BASOPHILS # BLD AUTO: 0 10E9/L (ref 0–0.2)
BASOPHILS NFR BLD AUTO: 0.5 %
DIFFERENTIAL METHOD BLD: NORMAL
EOSINOPHIL # BLD AUTO: 0.4 10E9/L (ref 0–0.7)
EOSINOPHIL NFR BLD AUTO: 4.8 %
ERYTHROCYTE [DISTWIDTH] IN BLOOD BY AUTOMATED COUNT: 12.9 % (ref 10–15)
ERYTHROCYTE [SEDIMENTATION RATE] IN BLOOD BY WESTERGREN METHOD: 5 MM/H (ref 0–15)
HCT VFR BLD AUTO: 45.9 % (ref 40–53)
HGB BLD-MCNC: 15.1 G/DL (ref 13.3–17.7)
LYMPHOCYTES # BLD AUTO: 3.1 10E9/L (ref 0.8–5.3)
LYMPHOCYTES NFR BLD AUTO: 37.7 %
MCH RBC QN AUTO: 28.1 PG (ref 26.5–33)
MCHC RBC AUTO-ENTMCNC: 32.9 G/DL (ref 31.5–36.5)
MCV RBC AUTO: 86 FL (ref 78–100)
MONOCYTES # BLD AUTO: 1.2 10E9/L (ref 0–1.3)
MONOCYTES NFR BLD AUTO: 13.8 %
NEUTROPHILS # BLD AUTO: 3.6 10E9/L (ref 1.6–8.3)
NEUTROPHILS NFR BLD AUTO: 43.2 %
PLATELET # BLD AUTO: 242 10E9/L (ref 150–450)
RBC # BLD AUTO: 5.37 10E12/L (ref 4.4–5.9)
WBC # BLD AUTO: 8.3 10E9/L (ref 4–11)

## 2018-11-23 PROCEDURE — 99395 PREV VISIT EST AGE 18-39: CPT | Performed by: FAMILY MEDICINE

## 2018-11-23 PROCEDURE — 85652 RBC SED RATE AUTOMATED: CPT | Performed by: FAMILY MEDICINE

## 2018-11-23 PROCEDURE — 80053 COMPREHEN METABOLIC PANEL: CPT | Performed by: FAMILY MEDICINE

## 2018-11-23 PROCEDURE — 85025 COMPLETE CBC W/AUTO DIFF WBC: CPT | Performed by: FAMILY MEDICINE

## 2018-11-23 PROCEDURE — 84443 ASSAY THYROID STIM HORMONE: CPT | Performed by: FAMILY MEDICINE

## 2018-11-23 PROCEDURE — 36415 COLL VENOUS BLD VENIPUNCTURE: CPT | Performed by: FAMILY MEDICINE

## 2018-11-23 ASSESSMENT — ENCOUNTER SYMPTOMS
CONSTIPATION: 1
NAUSEA: 1
NERVOUS/ANXIOUS: 1
SHORTNESS OF BREATH: 1

## 2018-11-23 ASSESSMENT — PATIENT HEALTH QUESTIONNAIRE - PHQ9
SUM OF ALL RESPONSES TO PHQ QUESTIONS 1-9: 13
10. IF YOU CHECKED OFF ANY PROBLEMS, HOW DIFFICULT HAVE THESE PROBLEMS MADE IT FOR YOU TO DO YOUR WORK, TAKE CARE OF THINGS AT HOME, OR GET ALONG WITH OTHER PEOPLE: SOMEWHAT DIFFICULT
SUM OF ALL RESPONSES TO PHQ QUESTIONS 1-9: 13

## 2018-11-23 NOTE — MR AVS SNAPSHOT
After Visit Summary   11/23/2018    Oni Anton    MRN: [id number]           Patient Information     Date Of Birth          1995        Visit Information        Provider Department      11/23/2018 2:15 PM Ziggy Britt MD [facility]        Today's Diagnoses     Well adult exam    -  1    Irritable bowel syndrome, unspecified type        Major depressive disorder, recurrent episode, moderate (H)        Lipid screening          Care Instructions      Preventive Health Recommendations  Male Ages 21 - 25     Yearly exam:             See your health care provider every year in order to  o   Review health changes.   o   Discuss preventive care.    o   Review your medicines if your doctor has prescribed any.    You should be tested each year for STDs (sexually transmitted diseases).     Talk to your provider about cholesterol testing.      If you are at risk for diabetes, you should have a diabetes test (fasting glucose).    Shots: Get a flu shot each year. Get a tetanus shot every 10 years.     Nutrition:    Eat at least 5 servings of fruits and vegetables daily.     Eat whole-grain bread, whole-wheat pasta and brown rice instead of white grains and rice.     Get adequate calcium and Vitamin D.     Lifestyle    Exercise for at least 150 minutes a week (30 minutes a day, 5 days a week). This will help you control your weight and prevent disease.     Limit alcohol to one drink per day.     No smoking.     Wear sunscreen to prevent skin cancer.     See your dentist every six months for an exam and cleaning.             Follow-ups after your visit        Who to contact     If you have questions or need follow up information about today's clinic visit or your schedule please contact [facility] directly at [phone].  Normal or non-critical lab and imaging results will be communicated to you by MyChart, letter or phone within 4 business days after the  "clinic has received the results. If you do not hear from us within 7 days, please contact the clinic through [employer] or phone. If you have a critical or abnormal lab result, we will notify you by phone as soon as possible.  Submit refill requests through DoPay or call your pharmacy and they will forward the refill request to us. Please allow 3 business days for your refill to be completed.          Additional Information About Your Visit        DoPay Information     DoPay gives you secure access to your electronic health record. If you see a primary care provider, you can also send messages to your care team and make appointments. If you have questions, please call your primary care clinic.  If you do not have a primary care provider, please call [phone] and they will assist you.        Care EveryWhere ID     This is your Care EveryWhere ID. This could be used by other organizations to access your [place] medical records  [id number]        Your Vitals Were     Pulse Temperature Respirations Height Pulse Oximetry BMI (Body Mass Index)    75 97.9  F (36.6  C) (Oral) 12 6' 3\" (1.905 m) 96% 27.47 kg/m2       Blood Pressure from Last 3 Encounters:   11/23/18 138/88   11/30/17 140/80   09/05/17 132/70    Weight from Last 3 Encounters:   11/23/18 219 lb 12.8 oz (99.7 kg)   11/30/17 218 lb 1.6 oz (98.9 kg)   09/05/17 209 lb 9.6 oz (95.1 kg)              We Performed the Following     CBC with platelets and differential     Comprehensive metabolic panel     Erythrocyte sedimentation rate auto     TSH with free T4 reflex          Today's Medication Changes          These changes are accurate as of 11/23/18  3:03 PM.  If you have any questions, ask your nurse or doctor.               Stop taking these medicines if you haven't already. Please contact your care team if you have questions.     lidocaine (viscous) 2 % solution   Commonly known as:  XYLOCAINE   Stopped by:  Ziggy Britt MD                 "    Primary Care Provider Office Phone # Fax #    Blanca Anguiano [phone]       [id number]  [facility] [address]        Equal Access to Services     DIANAMARIA ELENA XAVIER : Hadii hermila talamantes hadleonardoo Soelizabethali, waaxda luqadaha, qaybta kaalmada adeliuyada, shamir hyde meenuliu bauer laRadhacedric etienne. So [facility] [phone].    ATENCIÓN: Si habla español, tiene a romero disposición servicios gratuitos de asistencia lingüística. Llame al [phone].    We comply with applicable federal civil rights laws and [place] laws. We do not discriminate on the basis of race, color, national origin, age, disability, sex, sexual orientation, or gender identity.            Thank you!     Thank you for choosing [facility]  for your care. Our goal is always to provide you with excellent care. Hearing back from our patients is one way we can continue to improve our services. Please take a few minutes to complete the written survey that you may receive in the mail after your visit with us. Thank you!             Your Updated Medication List - Protect others around you: Learn how to safely use, store and throw away your medicines at www.disposemymeds.org.      Notice  As of 11/23/2018  3:03 PM    You have not been prescribed any medications.

## 2018-11-23 NOTE — PROGRESS NOTES
SUBJECTIVE:   CC: Oni Anton is an 23 year old male who presents for preventative health visit.     Physical   Annual:     Getting at least 3 servings of Calcium per day:  Yes    Bi-annual eye exam:  NO    Dental care twice a year:  Yes    Sleep apnea or symptoms of sleep apnea:  Daytime drowsiness    Diet:  Regular (no restrictions)    Frequency of exercise:  2-3 days/week    Duration of exercise:  15-30 minutes    Taking medications regularly:  Yes    Medication side effects:  Not applicable    Additional concerns today:  Yes      [facility], feels like he somatizes his anxiety now with stomach upset   He lives in [place] and comes here due to insurance but won't be back in town for months. Plans to have local insurance early in the new year and establish care there.     Today's PHQ-2 Score:   PHQ-2 ( 1999 [employer]) 11/23/2018   Q1: Little interest or pleasure in doing things 2   Q2: Feeling down, depressed or hopeless 2   PHQ-2 Score 4   Q1: Little interest or pleasure in doing things More than half the days   Q2: Feeling down, depressed or hopeless More than half the days   PHQ-2 Score 4       Abuse: Current or Past(Physical, Sexual or Emotional)- No  Do you feel safe in your environment - Yes    Social History   Substance Use Topics     Smoking status: Never Smoker     Smokeless tobacco: Never Used     Alcohol use No     Alcohol Use 11/23/2018   If you drink alcohol do you typically have greater than 3 drinks per day OR greater than 7 drinks per week? No       Last PSA:   PSA   Date Value Ref Range Status   05/04/2016 1.00 0 - 4 ug/L Final                   Review of Systems   Respiratory: Positive for shortness of breath.    Gastrointestinal: Positive for constipation and nausea.   Genitourinary: Negative for discharge and impotence.   Psychiatric/Behavioral: Positive for mood changes. The patient is nervous/anxious.      CONSTITUTIONAL: NEGATIVE for fever, chills, change in  weight  INTEGUMENTARY/SKIN: NEGATIVE for worrisome rashes, moles or lesions  EYES: NEGATIVE for vision changes or irritation  ENT: NEGATIVE for ear, mouth and throat problems  RESP: NEGATIVE for significant cough or SOB  CV: NEGATIVE for chest pain, palpitations or peripheral edema  GI: NEGATIVE for nausea, abdominal pain, heartburn, or change in bowel habits   male: negative for dysuria, hematuria, decreased urinary stream, erectile dysfunction, urethral discharge  MUSCULOSKELETAL: NEGATIVE for significant arthralgias or myalgia  NEURO: NEGATIVE for weakness, dizziness or paresthesias  PSYCHIATRIC: NEGATIVE for changes in mood or affect    OBJECTIVE:   There were no vitals taken for this visit.    Physical Exam  GENERAL: healthy, alert and no distress  EYES: Eyes grossly normal to inspection, PERRL and conjunctivae and sclerae normal  HENT: ear canals and TM's normal, nose and mouth without ulcers or lesions  NECK: no adenopathy, no asymmetry, masses, or scars and thyroid normal to palpation  RESP: lungs clear to auscultation - no rales, rhonchi or wheezes  CV: regular rate and rhythm, normal S1 S2, no S3 or S4, no murmur, click or rub, no peripheral edema and peripheral pulses strong  ABDOMEN: soft, nontender, no hepatosplenomegaly, no masses and bowel sounds normal   (male): normal male genitalia without lesions or urethral discharge, no hernia  MS: no gross musculoskeletal defects noted, no edema  SKIN: no suspicious lesions or rashes  NEURO: Normal strength and tone, mentation intact and speech normal  PSYCH: mentation appears normal, affect normal/bright    Diagnostic Test Results:  none yet availble    ASSESSMENT/PLAN:   (Z00.00) Well adult exam  (primary encounter diagnosis)  Comment:   Plan: Comprehensive metabolic panel            (K58.9) Irritable bowel syndrome, unspecified type  Comment:   Plan: CBC with platelets and differential,         Erythrocyte sedimentation rate auto, TSH with         free  "T4 reflex            (F33.1) Major depressive disorder, recurrent episode, moderate (H)  Comment:   Plan: TSH with free T4 reflex            (Z13.220) Lipid screening  Comment:   Plan:         COUNSELING:   Reviewed preventive health counseling, as reflected in patient instructions       Regular exercise       Healthy diet/nutrition       Vision screening    BP Readings from Last 1 Encounters:   11/30/17 140/80     Estimated body mass index is 28 kg/(m^2) as calculated from the following:    Height as of 3/23/17: 6' 2\" (1.88 m).    Weight as of 11/30/17: 218 lb 1.6 oz (98.9 kg).      Weight management plan: Discussed healthy diet and exercise guidelines and patient will follow up in 6 months in clinic to re-evaluate.     reports that he has never smoked. He has never used smokeless tobacco.      Counseling Resources:  ATP IV Guidelines  Pooled Cohorts Equation Calculator  FRAX Risk Assessment  ICSI Preventive Guidelines  Dietary Guidelines for Americans, 2010  USDA's MyPlate  ASA Prophylaxis  Lung CA Screening    Ziggy Britt MD  [facility]  Answers for HPI/ROS submitted by the patient on 11/23/2018   PHQ-2 Score: 4  If you checked off any problems, how difficult have these problems made it for you to do your work, take care of things at home, or get along with other people?: Somewhat difficult  PHQ9 TOTAL SCORE: 13    "

## 2018-11-24 LAB
ALBUMIN SERPL-MCNC: 4.5 G/DL (ref 3.4–5)
ALP SERPL-CCNC: 101 U/L (ref 40–150)
ALT SERPL W P-5'-P-CCNC: 103 U/L (ref 0–70)
ANION GAP SERPL CALCULATED.3IONS-SCNC: 7 MMOL/L (ref 3–14)
AST SERPL W P-5'-P-CCNC: 39 U/L (ref 0–45)
BILIRUB SERPL-MCNC: 0.5 MG/DL (ref 0.2–1.3)
BUN SERPL-MCNC: 9 MG/DL (ref 7–30)
CALCIUM SERPL-MCNC: 9.8 MG/DL (ref 8.5–10.1)
CHLORIDE SERPL-SCNC: 105 MMOL/L (ref 94–109)
CO2 SERPL-SCNC: 25 MMOL/L (ref 20–32)
CREAT SERPL-MCNC: 0.87 MG/DL (ref 0.66–1.25)
GFR SERPL CREATININE-BSD FRML MDRD: >90 ML/MIN/1.7M2
GLUCOSE SERPL-MCNC: 98 MG/DL (ref 70–99)
POTASSIUM SERPL-SCNC: 4.1 MMOL/L (ref 3.4–5.3)
PROT SERPL-MCNC: 8.2 G/DL (ref 6.8–8.8)
SODIUM SERPL-SCNC: 137 MMOL/L (ref 133–144)
TSH SERPL DL<=0.005 MIU/L-ACNC: 0.59 MU/L (ref 0.4–4)

## 2018-11-24 ASSESSMENT — PATIENT HEALTH QUESTIONNAIRE - PHQ9: SUM OF ALL RESPONSES TO PHQ QUESTIONS 1-9: 13

## 2019-04-05 ENCOUNTER — TELEPHONE (OUTPATIENT)
Dept: FAMILY MEDICINE | Facility: CLINIC | Age: 24
End: 2019-04-05

## 2019-04-05 NOTE — TELEPHONE ENCOUNTER
Panel Management Review      Patient has the following on his problem list:     Depression / Dysthymia review    Measure:  Needs PHQ-9 score of 4 or less during index window.  Administer PHQ-9 and if score is 5 or more, send encounter to provider for next steps.    5 - 7 month window range: 3/23/19-7/23/19    PHQ-9 SCORE 3/23/2017 9/5/2017 11/23/2018   PHQ-9 Total Score - - -   PHQ-9 Total Score MyChart - - 13 (Moderate depression)   PHQ-9 Total Score 17 15 13   PHQ-9 Total Score - - -       If PHQ-9 recheck is 5 or more, route to provider for next steps.    Patient is due for:  PHQ9      Composite cancer screening  Chart review shows that this patient is due/due soon for the following None  Summary:    Patient is due/failing the following:   PHQ9    Action needed:   Patient needs to do PHQ9.    Type of outreach:    Panel pool please call pt, he does not use MyChart    Questions for provider review:    None                                                                                                                                    Mechelle Camp, [facility]     Chart routed to Care Team .

## 2019-04-22 ENCOUNTER — MYC MEDICAL ADVICE (OUTPATIENT)
Dept: FAMILY MEDICINE | Facility: CLINIC | Age: 24
End: 2019-04-22

## 2019-04-22 NOTE — TELEPHONE ENCOUNTER
Type of outreach:  Phone, spoke to patient.  Pt would like PHQ9 sent to him via [employer].  I will do as he requested.  Shari Schoenberger, [facility]

## 2019-04-23 ASSESSMENT — PATIENT HEALTH QUESTIONNAIRE - PHQ9
SUM OF ALL RESPONSES TO PHQ QUESTIONS 1-9: 15
SUM OF ALL RESPONSES TO PHQ QUESTIONS 1-9: 15
10. IF YOU CHECKED OFF ANY PROBLEMS, HOW DIFFICULT HAVE THESE PROBLEMS MADE IT FOR YOU TO DO YOUR WORK, TAKE CARE OF THINGS AT HOME, OR GET ALONG WITH OTHER PEOPLE: SOMEWHAT DIFFICULT

## 2019-04-24 ASSESSMENT — PATIENT HEALTH QUESTIONNAIRE - PHQ9: SUM OF ALL RESPONSES TO PHQ QUESTIONS 1-9: 15

## 2019-04-30 NOTE — TELEPHONE ENCOUNTER
"Sent [place] message, see last visit notes, will monitor response, LMOVM TO CALL ASAP, not sure if correct number??     \"comes here due to insurance but won't be back in town for months. Plans to have local insurance early in the new year and establish care there in [place]\"  Blanca Queen RN, BSN  Message handled by Nurse Triage.      "

## 2019-05-01 NOTE — TELEPHONE ENCOUNTER
See pt Mychart response, pt lives in [place] and moving back in one month, pt encouraged to see medical appointment in [place] if concerns, will send JULIENNE MCGARRY MD FY, see Phq 9 below    [facility] Follow-up to PHQ 9/5/2017 11/23/2018 4/23/2019   PHQ-9 9. Suicide Ideation past 2 weeks Several days Several days Several days   Thoughts of suicide or self harm in past 2 weeks - No Yes   Thoughts of suicide or self harm in past 2 weeks - No Yes   PHQ-9 Self harm plan? - - No   PHQ-9 Self harm action? - - No   PHQ-9 Safety concerns? - No No   PHQ-9 Safety concerns? - No No     Blanca Queen RN, BSN  Message handled by Nurse Triage.

## 2019-06-19 ENCOUNTER — MYC MEDICAL ADVICE (OUTPATIENT)
Dept: FAMILY MEDICINE | Facility: CLINIC | Age: 24
End: 2019-06-19

## 2019-06-19 ASSESSMENT — ANXIETY QUESTIONNAIRES
7. FEELING AFRAID AS IF SOMETHING AWFUL MIGHT HAPPEN: MORE THAN HALF THE DAYS
4. TROUBLE RELAXING: MORE THAN HALF THE DAYS
GAD7 TOTAL SCORE: 14
3. WORRYING TOO MUCH ABOUT DIFFERENT THINGS: MORE THAN HALF THE DAYS
6. BECOMING EASILY ANNOYED OR IRRITABLE: MORE THAN HALF THE DAYS
7. FEELING AFRAID AS IF SOMETHING AWFUL MIGHT HAPPEN: MORE THAN HALF THE DAYS
2. NOT BEING ABLE TO STOP OR CONTROL WORRYING: MORE THAN HALF THE DAYS
1. FEELING NERVOUS, ANXIOUS, OR ON EDGE: MORE THAN HALF THE DAYS
GAD7 TOTAL SCORE: 14
5. BEING SO RESTLESS THAT IT IS HARD TO SIT STILL: MORE THAN HALF THE DAYS
GAD7 TOTAL SCORE: 14

## 2019-06-19 ASSESSMENT — PATIENT HEALTH QUESTIONNAIRE - PHQ9
SUM OF ALL RESPONSES TO PHQ QUESTIONS 1-9: 16
10. IF YOU CHECKED OFF ANY PROBLEMS, HOW DIFFICULT HAVE THESE PROBLEMS MADE IT FOR YOU TO DO YOUR WORK, TAKE CARE OF THINGS AT HOME, OR GET ALONG WITH OTHER PEOPLE: SOMEWHAT DIFFICULT
SUM OF ALL RESPONSES TO PHQ QUESTIONS 1-9: 16

## 2019-06-19 NOTE — TELEPHONE ENCOUNTER
Spoke to pt and recently moved back and would like to establish care around [facility].  Scheduled him in at [facility].  PHQ9 has been pretty similar the last 3 times.  Pt says he does not feel he is concerned for his safety or that his safety is an issue.  Pt says he is not having any suicidal ideation and is fine to wait until Monday's apt.  He says he has been in therapy for 6 years and although he still has negative thoughts, they don't effect him as much due to the therapy.    Bernadine López RN

## 2019-06-19 NOTE — TELEPHONE ENCOUNTER
Reached out to patient for quality. Please triage patient in regards to PHQ 9 response.   Thank you-

## 2019-06-20 ASSESSMENT — ANXIETY QUESTIONNAIRES: GAD7 TOTAL SCORE: 14

## 2019-06-24 ENCOUNTER — OFFICE VISIT (OUTPATIENT)
Dept: FAMILY MEDICINE | Facility: CLINIC | Age: 24
End: 2019-06-24
Payer: COMMERCIAL

## 2019-06-24 VITALS
SYSTOLIC BLOOD PRESSURE: 134 MMHG | HEIGHT: 74 IN | BODY MASS INDEX: 28.18 KG/M2 | WEIGHT: 219.6 LBS | DIASTOLIC BLOOD PRESSURE: 85 MMHG | OXYGEN SATURATION: 98 % | TEMPERATURE: 96.8 F | HEART RATE: 75 BPM

## 2019-06-24 DIAGNOSIS — Z23 NEED FOR VACCINATION: ICD-10-CM

## 2019-06-24 DIAGNOSIS — Z00.00 ROUTINE GENERAL MEDICAL EXAMINATION AT A HEALTH CARE FACILITY: Primary | ICD-10-CM

## 2019-06-24 DIAGNOSIS — F41.9 ANXIETY: ICD-10-CM

## 2019-06-24 DIAGNOSIS — F33.1 MAJOR DEPRESSIVE DISORDER, RECURRENT EPISODE, MODERATE (H): ICD-10-CM

## 2019-06-24 PROCEDURE — 90471 IMMUNIZATION ADMIN: CPT | Performed by: FAMILY MEDICINE

## 2019-06-24 PROCEDURE — 99214 OFFICE O/P EST MOD 30 MIN: CPT | Mod: 25 | Performed by: FAMILY MEDICINE

## 2019-06-24 PROCEDURE — 99395 PREV VISIT EST AGE 18-39: CPT | Mod: 25 | Performed by: FAMILY MEDICINE

## 2019-06-24 PROCEDURE — 90715 TDAP VACCINE 7 YRS/> IM: CPT | Performed by: FAMILY MEDICINE

## 2019-06-24 RX ORDER — BUSPIRONE HYDROCHLORIDE 5 MG/1
5 TABLET ORAL 3 TIMES DAILY
Qty: 90 TABLET | Refills: 3 | Status: SHIPPED | OUTPATIENT
Start: 2019-06-24 | End: 2019-12-18

## 2019-06-24 ASSESSMENT — MIFFLIN-ST. JEOR: SCORE: 2055.85

## 2019-06-24 NOTE — PATIENT INSTRUCTIONS
Psychiatry Offices accepting new patients:  [facility]  [phone]    [facility]:  Intake Line: [phone]  [email or web address]    [facility]:   Intake Line (called a Needs Assessment): [phone]   [email or web address]    -----      Preventive Health Recommendations  Male Ages 21 - 25     Yearly exam:             See your health care provider every year in order to  o   Review health changes.   o   Discuss preventive care.    o   Review your medicines if your doctor has prescribed any.    You should be tested each year for STDs (sexually transmitted diseases).     Talk to your provider about cholesterol testing.      If you are at risk for diabetes, you should have a diabetes test (fasting glucose).    Shots: Get a flu shot each year. Get a tetanus shot every 10 years.     Nutrition:    Eat at least 5 servings of fruits and vegetables daily.     Eat whole-grain bread, whole-wheat pasta and brown rice instead of white grains and rice.     Get adequate calcium and Vitamin D.     Lifestyle    Exercise for at least 150 minutes a week (30 minutes a day, 5 days a week). This will help you control your weight and prevent disease.     Limit alcohol to one drink per day.     No smoking.     Wear sunscreen to prevent skin cancer.     See your dentist every six months for an exam and cleaning.

## 2019-09-30 ENCOUNTER — HEALTH MAINTENANCE LETTER (OUTPATIENT)
Age: 24
End: 2019-09-30

## 2019-12-12 ENCOUNTER — OFFICE VISIT (OUTPATIENT)
Dept: FAMILY MEDICINE | Facility: CLINIC | Age: 24
End: 2019-12-12
Payer: COMMERCIAL

## 2019-12-12 VITALS
TEMPERATURE: 96.9 F | OXYGEN SATURATION: 97 % | HEIGHT: 74 IN | WEIGHT: 225 LBS | SYSTOLIC BLOOD PRESSURE: 131 MMHG | RESPIRATION RATE: 18 BRPM | BODY MASS INDEX: 28.88 KG/M2 | DIASTOLIC BLOOD PRESSURE: 88 MMHG | HEART RATE: 77 BPM

## 2019-12-12 DIAGNOSIS — R41.840 ATTENTION AND CONCENTRATION DEFICIT: ICD-10-CM

## 2019-12-12 DIAGNOSIS — F41.9 ANXIETY: Primary | ICD-10-CM

## 2019-12-12 PROCEDURE — 99214 OFFICE O/P EST MOD 30 MIN: CPT | Performed by: FAMILY MEDICINE

## 2019-12-12 PROCEDURE — 96127 BRIEF EMOTIONAL/BEHAV ASSMT: CPT | Performed by: FAMILY MEDICINE

## 2019-12-12 ASSESSMENT — ANXIETY QUESTIONNAIRES
6. BECOMING EASILY ANNOYED OR IRRITABLE: MORE THAN HALF THE DAYS
2. NOT BEING ABLE TO STOP OR CONTROL WORRYING: MORE THAN HALF THE DAYS
7. FEELING AFRAID AS IF SOMETHING AWFUL MIGHT HAPPEN: MORE THAN HALF THE DAYS
GAD7 TOTAL SCORE: 15
IF YOU CHECKED OFF ANY PROBLEMS ON THIS QUESTIONNAIRE, HOW DIFFICULT HAVE THESE PROBLEMS MADE IT FOR YOU TO DO YOUR WORK, TAKE CARE OF THINGS AT HOME, OR GET ALONG WITH OTHER PEOPLE: VERY DIFFICULT
1. FEELING NERVOUS, ANXIOUS, OR ON EDGE: MORE THAN HALF THE DAYS
5. BEING SO RESTLESS THAT IT IS HARD TO SIT STILL: MORE THAN HALF THE DAYS
3. WORRYING TOO MUCH ABOUT DIFFERENT THINGS: MORE THAN HALF THE DAYS

## 2019-12-12 ASSESSMENT — PATIENT HEALTH QUESTIONNAIRE - PHQ9
SUM OF ALL RESPONSES TO PHQ QUESTIONS 1-9: 18
5. POOR APPETITE OR OVEREATING: NEARLY EVERY DAY

## 2019-12-12 ASSESSMENT — MIFFLIN-ST. JEOR: SCORE: 2075.34

## 2019-12-12 NOTE — PROGRESS NOTES
Subjective     Oni Anton is a 24 year old male who presents to clinic today for the following health issues:    HPI   Depression and Anxiety Follow-Up    How are you doing with your depression since your last visit? Worsened due to life event     How are you doing with your anxiety since your last visit?  Worsened due to life event     Are you having other symptoms that might be associated with depression or anxiety? Yes:  panic attacks    Have you had a significant life event? OTHER: pt got laid off      Do you have any concerns with your use of alcohol or other drugs? No    Social History     Tobacco Use     Smoking status: Never Smoker     Smokeless tobacco: Never Used   Substance Use Topics     Alcohol use: No     Drug use: No     PHQ 6/19/2019 12/5/2019 12/12/2019   PHQ-9 Total Score 16 15 18   Q9: Thoughts of better off dead/self-harm past 2 weeks Several days Several days Several days   F/U: Thoughts of suicide or self-harm No No No   F/U: Safety concerns No No No     ANAHY-7 SCORE 6/24/2015 6/19/2019 12/12/2019   Total Score - - -   Total Score - 14 (moderate anxiety) -   Total Score - 14 15   Total Score - - -   Total Score BEH Adult 14 - -   The patient tried multiple medications in the past. Not responding to any medications.     Citalpram  Prozac  Wellbutrin  Busbar  Adderall.     Last PHQ-9 12/12/2019   1.  Little interest or pleasure in doing things 2   2.  Feeling down, depressed, or hopeless 2   3.  Trouble falling or staying asleep, or sleeping too much 3   4.  Feeling tired or having little energy 2   5.  Poor appetite or overeating 2   6.  Feeling bad about yourself 2   7.  Trouble concentrating 3   8.  Moving slowly or restless 1   Q9: Thoughts of better off dead/self-harm past 2 weeks 1   PHQ-9 Total Score 18   Difficulty at work, home, or with people Very difficult   In the past two weeks have you had thoughts of suicide or self harm? No   Do you have concerns about your personal safety or  the safety of others? No     ANAHY-7  12/12/2019   1. Feeling nervous, anxious, or on edge 2   2. Not being able to stop or control worrying 2   3. Worrying too much about different things 2   4. Trouble relaxing 3   5. Being so restless that it is hard to sit still 2   6. Becoming easily annoyed or irritable 2   7. Feeling afraid, as if something awful might happen 2   AANHY-7 Total Score 15   If you checked any problems, how difficult have they made it for you to do your work, take care of things at home, or get along with other people? Very difficult       Suicide Assessment Five-step Evaluation and Treatment (SAFE-T)      How many servings of fruits and vegetables do you eat daily?  0-1 per patient diet is poor     On average, how many sweetened beverages do you drink each day (Examples: soda, juice, sweet tea, etc.  Do NOT count diet or artificially sweetened beverages)?   3  How many days per week do you miss taking your medication? 7    What makes it hard for you to take your medications?  side effects      Patient Active Problem List   Diagnosis     CARDIOVASCULAR SCREENING; LDL GOAL LESS THAN 160     Attention deficit disorder     Major depressive disorder, recurrent episode, moderate (H)     Cervicalgia     Right-sided thoracic back pain     Episodic tension-type headache, not intractable     Right shoulder pain     ANAHY (generalized anxiety disorder)     Past Surgical History:   Procedure Laterality Date     C PE TUBE         Social History     Tobacco Use     Smoking status: Never Smoker     Smokeless tobacco: Never Used   Substance Use Topics     Alcohol use: No     Family History   Problem Relation Age of Onset     Diabetes Paternal Grandmother      Hypertension Paternal Grandfather      Lipids Paternal Grandfather      Myocardial Infarction Maternal Grandfather 47     Lipids Father      Depression Father            Reviewed and updated as needed this visit by Provider         Review of Systems   ROS COMP:  "Constitutional, HEENT, cardiovascular, pulmonary, gi and gu systems are negative, except as otherwise noted.      Objective    /88   Pulse 77   Temp 96.9  F (36.1  C) (Oral)   Resp 18   Ht 1.88 m (6' 2\")   Wt 102.1 kg (225 lb)   SpO2 97%   BMI 28.89 kg/m    Body mass index is 28.89 kg/m .  Physical Exam   GENERAL: healthy, alert and no distress  RESP: lungs clear to auscultation - no rales, rhonchi or wheezes  CV: regular rate and rhythm, normal S1 S2, no S3 or S4, no murmur, click or rub, no peripheral edema and peripheral pulses strong  PSYCH: mentation appears normal, affect normal/bright    Diagnostic Test Results:  Labs reviewed in Epic        Assessment & Plan       ICD-10-CM    1. Anxiety F41.9 HC BEHAV ASSMT W/SCORE & DOCD/STAND INSTRUMENT   2. Attention and concentration deficit R41.840 MENTAL HEALTH REFERRAL  - Adult; Assessments and Testing; ADHD; [facility]: [facility] [phone]; We will contact you to schedule the appointment or please call with any questions     Will not start any medications today.  Referred to [facility] for [facility] testing.     Return in about 1 month (around 1/12/2020) for Routine Visit with PCP - If needed.    Sean Marrero MD  [facility]    "

## 2019-12-13 ASSESSMENT — ANXIETY QUESTIONNAIRES: GAD7 TOTAL SCORE: 15

## 2019-12-17 ENCOUNTER — OFFICE VISIT (OUTPATIENT)
Dept: PHARMACY | Facility: CLINIC | Age: 24
End: 2019-12-17
Payer: COMMERCIAL

## 2019-12-17 VITALS — SYSTOLIC BLOOD PRESSURE: 138 MMHG | DIASTOLIC BLOOD PRESSURE: 94 MMHG | BODY MASS INDEX: 29.39 KG/M2 | WEIGHT: 228.9 LBS

## 2019-12-17 DIAGNOSIS — F41.1 GAD (GENERALIZED ANXIETY DISORDER): Primary | ICD-10-CM

## 2019-12-17 DIAGNOSIS — F33.1 MAJOR DEPRESSIVE DISORDER, RECURRENT EPISODE, MODERATE (H): ICD-10-CM

## 2019-12-17 PROCEDURE — 99605 MTMS BY PHARM NP 15 MIN: CPT | Performed by: PHARMACIST

## 2019-12-17 RX ORDER — VILAZODONE HYDROCHLORIDE 10 MG/1
10 TABLET ORAL DAILY
Qty: 30 TABLET | Refills: 0 | Status: SHIPPED | OUTPATIENT
Start: 2019-12-17 | End: 2023-12-04

## 2019-12-17 NOTE — PROGRESS NOTES
"SUBJECTIVE/OBJECTIVE:                           Oni Anton is a 24 year old male coming in for an initial visit for Medication Therapy Management.  He was referred to me from Dr. Marrero.    Chief Complaint: Medication Therapy Management. He is interested in Genetic testing.     Allergies/ADRs: Reviewed in Epic  Tobacco:  reports that he has never smoked. He has never used smokeless tobacco.  Alcohol: 4-6 beverages / week - only on weekend, binge drinks, then doesn't drink the rest of the week  PMH: Reviewed in Epic    Medication Adherence/Access:  no issues reported    Anxiety/depression: Current medication includes buspirone 5 mg TID. He reports he stopped this due to nausea after a couple days. Made him feel like his head was swollen and dizzy. He reports this made him feel tired, which may have helped his anxiety because he was \"too tired to worry about things\", but otherwise not effective.  History includes: Prozac, Celexa, Lexapro, Adderall, Zoloft, Wellbutrin, Fetzima and Buspar. He says there are likely others that he can't remember.    Adderall helped with his ADHD symptoms, but led to bothersome mood swings.   He felt these medications either didn't work or gave him bad side effects that made him stop taking it. He claims he did try each of them for a while (unless they had intolerable side effects) before stopping, and is aware they take 8-12 weeks to work.   He has never found anything that relives his symptoms, but some have made him more sedated or \"like a zombie\" which reduced his anxiety but didn't improve his QOL.   He notes his anxiety is more of a problem than his depression currently, but the two are \"intertwined\". His symptoms include trouble with productivity and motivation.   He is interested in trying a new medication today. He is unsure about genetic testing and would like to think about this more.     PHQ-9 SCORE 6/19/2019 12/5/2019 12/12/2019   PHQ-9 Total Score - - -   PHQ-9 Total " "Score MyChart 16 (Moderately severe depression) 15 (Moderately severe depression) -   PHQ-9 Total Score 16 15 18   PHQ-9 Total Score - - -     ANAHY-7 SCORE 6/24/2015 6/19/2019 12/12/2019   Total Score - - -   Total Score - 14 (moderate anxiety) -   Total Score - 14 15   Total Score - - -   Total Score BEH Adult 14 - -     Today's Vitals: BP (!) 138/94   Wt 228 lb 14.4 oz (103.8 kg)   BMI 29.39 kg/m      ASSESSMENT:                            Medication Adherence: good, no issues identified    Anxiety/depression: Needs improvement. He has failed many antidepressants and antianxiety medications, and would benefit from genetic testing. Knowing that he would like to inquire more about the financial risk, will start with medications that typically show in the \"use as directed\" box for genetic testing - Viibryd and Pristiq are good options. Additionally it doesn't seem like he's tried an SNRI. Viibryd seems to be a bit better for anxiety and has a  SE profile, so will start with this.     Elevated BP: His BP is above goal today, but he reports that he has white coat HTN. Will monitor.     PLAN:                            1. Start Viibryd 10 mg once daily. Will titrate as tolerated.     I spent 45 minutes with this patient today. All changes were made via collaborative practice agreement with Dr. Marrero. A copy of the visit note was provided to the patient's primary care provider.    Will follow up in a few days about coverage for Viibryd.     The patient was given a summary of these recommendations as an after visit summary.     Stephanie Sutton, PharmD  Medication Therapy Management Resident  Megan Pelayo, PharmD, REN, BCACP   Medication Therapy Management Pharmacist    "

## 2019-12-17 NOTE — PATIENT INSTRUCTIONS
Recommendations from today's MTM visit:                                                    MTM (medication therapy management) is a service provided by a clinical pharmacist designed to help you get the most of out of your medicines.   Today we reviewed what your medicines are for, how to know if they are working, that your medicines are safe and how to make your medicine regimen as easy as possible.   1. Start Viibryd (vilazodone) 10 mg once daily in the morning, take with food. We will increase from there depending on how you feel.     3. If you decide to move forward with genetic testing, let us know. We will be happy to help you.     It was great to speak with you today.  I value your experience and would be very thankful for your time with providing feedback on our clinic survey. You may receive a survey via email or text message in the next few days.     Next MTM visit: in a few weeks via [employer] to see how Viibryd is going.    To schedule another MTM appointment, please call the clinic directly or you may call the MTM scheduling line at [phone] or toll-free at [phone].     My Clinical Pharmacist's contact information:                                                      It was a pleasure talking with you today!  Please feel free to contact me with any questions or concerns you have.      Stephanie Lundy PharmD  Medication Therapy Management Resident, [facility]  Pager: [phone]  Email:: [email or web address]    Megan Pelayo, Pharm.D., M.B.A., [facility]  Medication Therapy Management Pharmacist, [facility]  Pager: [phone]  Email: [email or web address]

## 2020-02-04 ENCOUNTER — TELEPHONE (OUTPATIENT)
Dept: PHARMACY | Facility: CLINIC | Age: 25
End: 2020-02-04

## 2020-02-04 NOTE — TELEPHONE ENCOUNTER
Pt has not read last two Cloudwords messages - called to f/u on Viibryd start. No answer, message left.   Megan Pelayo, PharmD, REN, BCACP  MTM Pharmacist, [facility]

## 2020-02-18 ENCOUNTER — FCC EXTENDED DOCUMENTATION (OUTPATIENT)
Dept: PSYCHOLOGY | Facility: CLINIC | Age: 25
End: 2020-02-18

## 2020-02-18 ENCOUNTER — OFFICE VISIT (OUTPATIENT)
Dept: PSYCHOLOGY | Facility: CLINIC | Age: 25
End: 2020-02-18
Attending: FAMILY MEDICINE
Payer: COMMERCIAL

## 2020-02-18 DIAGNOSIS — F33.1 MAJOR DEPRESSIVE DISORDER, RECURRENT EPISODE, MODERATE (H): Primary | ICD-10-CM

## 2020-02-18 DIAGNOSIS — F41.1 GENERALIZED ANXIETY DISORDER: ICD-10-CM

## 2020-02-18 PROCEDURE — 90791 PSYCH DIAGNOSTIC EVALUATION: CPT | Performed by: PSYCHOLOGIST

## 2020-02-18 ASSESSMENT — COLUMBIA-SUICIDE SEVERITY RATING SCALE - C-SSRS
2. HAVE YOU ACTUALLY HAD ANY THOUGHTS OF KILLING YOURSELF?: NO
1. IN THE PAST MONTH, HAVE YOU WISHED YOU WERE DEAD OR WISHED YOU COULD GO TO SLEEP AND NOT WAKE UP?: YES
1. IN THE PAST MONTH, HAVE YOU WISHED YOU WERE DEAD OR WISHED YOU COULD GO TO SLEEP AND NOT WAKE UP?: NO
2. HAVE YOU ACTUALLY HAD ANY THOUGHTS OF KILLING YOURSELF LIFETIME?: NO

## 2020-02-18 ASSESSMENT — ANXIETY QUESTIONNAIRES
3. WORRYING TOO MUCH ABOUT DIFFERENT THINGS: MORE THAN HALF THE DAYS
5. BEING SO RESTLESS THAT IT IS HARD TO SIT STILL: SEVERAL DAYS
2. NOT BEING ABLE TO STOP OR CONTROL WORRYING: MORE THAN HALF THE DAYS
7. FEELING AFRAID AS IF SOMETHING AWFUL MIGHT HAPPEN: MORE THAN HALF THE DAYS
GAD7 TOTAL SCORE: 12
6. BECOMING EASILY ANNOYED OR IRRITABLE: SEVERAL DAYS
1. FEELING NERVOUS, ANXIOUS, OR ON EDGE: MORE THAN HALF THE DAYS
IF YOU CHECKED OFF ANY PROBLEMS ON THIS QUESTIONNAIRE, HOW DIFFICULT HAVE THESE PROBLEMS MADE IT FOR YOU TO DO YOUR WORK, TAKE CARE OF THINGS AT HOME, OR GET ALONG WITH OTHER PEOPLE: SOMEWHAT DIFFICULT

## 2020-02-18 ASSESSMENT — PATIENT HEALTH QUESTIONNAIRE - PHQ9
SUM OF ALL RESPONSES TO PHQ QUESTIONS 1-9: 14
5. POOR APPETITE OR OVEREATING: MORE THAN HALF THE DAYS

## 2020-02-18 NOTE — PROGRESS NOTES
"[facility]    PATIENT'S NAME: Oni Anton  PREFERRED NAME: Oni  PREFERRED PRONOUNS: He/Him/His/Himself  MRN:   [id number]  :   1995  ACCT. NUMBER: [id number]  DATE OF SERVICE: 20  START TIME: 10:00  END TIME: 10:49  PREFERRED PHONE: [phone]  May we leave a program related message: Yes    STANDARD DIAGNOSTIC ASSESSMENT    VIDEO VISIT: No    Identifying Information:  Patient is a 24 year old, .  The pronoun use throughout this assessment reflects the sex of the patient at birth.  Patient was referred for an assessment by primary care provider.  Patient attended the session alone.     The patient describes their cultural background as American.  Cultural influences and impact on patient's life structure, values, norms, and healthcare: NA.  The patient reports there are no ethnic, cultural or Adventist factors that may be relevant for therapy.  Patient identified his preferred language to be English. Patient reported he does not need the assistance of an  or other support involved in therapy. Modifications will not be used to assist communication in therapy.   Patient reports he is able to understand written materials.    Chief Complaint:   The reason for seeking services at this time is: \"ADHD Evaluation \"      History of Presenting Concern:  The problem(s) began in adolescence. Patient has attempted to resolve these concerns in the past through medication management. He was previously prescribed Citalopram, Prozac, Wellbutrin, Buspar, and Adderall. Patient reports that other professional(s) are currently involved in providing support / services. He is prescribed Buspar and Vilazodone by PCP.      Social/Family History:  Patient reported he grew up in [place], [place]. They were raised by biological parents. They were the first born of three children.  This is an intact family and parents remain .  Patient reported that his childhood was \"fine.\" He " recalled having social anxiety with school and didn't feel he fit in. He did have some good friends. Patient described his current relationships with family of origin as close.      Patient's highest education level was high school graduate and some college. Patient did identify the following learning problems: attention, concentration and reading. He explained that he had issues with learning how to read and he went to a place after school to help with reading.     Patient reported the following relationship history: one significant relationship.  Patient's current relationship status is partnered / significant other for 2 years.   Patient identified their sexual orientation as heterosexual. Patient reported he does not have children.    Patient's current living/housing situation involves renting a property with his partner. Patient identified parents, siblings and friends as part of their support system. Patient identified the quality of these relationships as good.      Patient is currently a student ([employer]) and he works part-time (4-8 hours per week). He is a . Patient did not serve in the . Patient reports their finances are obtained through employment and student loans. Patient does identify finances as a current stressor. He used to be making more money than he is now but he also has savings. He also has support from his partner and parents.    Patient reported that he has not been involved with the legal system.  Patient denies being on probation / parole / under the jurisdiction of the court.    Medical Issues:  Patient reports family history includes Depression in his father; Diabetes in his paternal grandmother; Hypertension in his paternal grandfather; Lipids in his father and paternal grandfather; Myocardial Infarction (age of onset: 47) in his maternal grandfather.    Patient has had a physical exam to rule out medical causes for current symptoms.  Date of last physical  exam was within the past year. Client was encouraged to follow up with PCP if symptoms were to develop. The patient has a [facility] Primary Care Provider, who is named Lino Bowen.  Patient reports no current medical concerns.  They did not report dental concerns.  There are not significant appetite / nutritional concerns / weight changes.  The patient has not been diagnosed with an eating disorder.  The patient denies the presence of chronic or episodic pain.  Patient does not report a history of head injury / trauma / cognitive impairment.      Patient reports not taking any current medications    Medication Adherence:  Patient reports taking prescribed medications as prescribed    Patient Allergies:  No Known Allergies    Medical History:  Past Medical History:   Diagnosis Date     Depression        Mental Health History:  Patient did report a family history of mental health concerns; see medical history section for details. His father was diagnosed with Depression and ADHD.  Patient previously received the following mental health diagnosis: Anxiety and Depression.  Patient reported symptoms began 7-8 years at age [age]. He noted that he was in high school at the time and he was having panic attacks. Patient has received the following mental health services in the past: counseling, psychiatry, medications from PCP.  He was previously prescribed Citalopram, Prozac, Wellbutrin, Buspar, and Adderall. He has had three different therapists in the past. He also previously worked with a psychiatrist who prescribed the Adderall. Hospitalizations: None. Patient denies a history of civil commitment.  Patient is currently receiving the following services: medications from PCP.   He was prescribed Vilazodone by PCP. He stopped taking Vilazodone because it was making him feel nauseous. He is interested in pursuing genetic testing. He hasn't seen a therapist or psychiatrist in 3-4 years. His last medications were  prescribed through PCP.    Current Mental Status Exam:   Appearance:  Appropriate   Eye Contact:  Good   Psychomotor:  Normal       Gait / station:  no problem  Attitude / Demeanor: Cooperative   Speech      Rate / Production: Normal       Volume:  Normal  volume      Language:  Rate/Production: Normal    Mood:   Normal  Affect:   Appropriate   Thought Content: Clear   Thought Process: Coherent  Logical       Associations: Volume: Normal    Insight:   Good   Judgment:  Intact   Orientation:  All  Attention/concentration: Good      Review of Symptoms:  Depression: Change in sleep, Lack of interest, Excessive or inappropriate guilt, Change in energy level, Difficulties concentrating, Change in appetite, Psychomotor slowing or agitation, Ruminations and Feeling sad, down, or depressed  Colleen:  No Symptoms  Psychosis: No Symptoms  Anxiety: Excessive worry, Nervousness and Physical complaints, such as headaches, stomachaches, muscle tension  Panic:  No symptoms  Post Traumatic Stress Disorder: No Symptoms  Eating Disorder: No Symptoms  Oppositional Defiant Disorder:  No Symptoms  ADD / ADHD:  Inattentive, Difficulties listening, Poor task completion, Poor organizational skills and Distractibility  Conduct Disorder: No symptoms  Autism Spectrum Disorder: No symptoms  Obsessive Compulsive Disorder: No Symptoms  Other Compulsive Behaviors: No symptoms   Substance Use: No symptoms    Rating Scales:  PHQ9     PHQ-9 SCORE 6/19/2019 12/5/2019 12/12/2019   PHQ-9 Total Score - - -   PHQ-9 Total Score MyChart 16 (Moderately severe depression) 15 (Moderately severe depression) -   PHQ-9 Total Score 16 15 18   PHQ-9 Total Score - - -     GAD7     ANAHY-7 SCORE 6/24/2015 6/19/2019 12/12/2019   Total Score - - -   Total Score - 14 (moderate anxiety) -   Total Score - 14 15   Total Score - - -   Total Score BEH Adult 14 - -     CGI   Clinical Global Impressions  Initial result: 4     Most recent result:       Substance Use  "History:  Patient did not report a family history of substance use concerns; see medical history section for details.  Patient has not received chemical dependency treatment in the past.  Patient has not ever been to detox.      Patient is not currently receiving any chemical dependency treatment. Patient reported the following problems as a result of their substance use: None.     Patient reports using alcohol 1-2 times per week and has 2-6 beers at a time. Patient first started drinking at age 17.  Patient reported date of last use was last weekend.  Patient reports heaviest use was during college.  Patient denies using tobacco.  Patient denies using marijuana.  Patient reports using caffeine 2 times per day and drinks 1 at a time. Patient started using caffeine at age 14.  Patient denies cocaine/crack use.  Patient denies meth/amphetamine use.  Patient denies use of heroin  Patient denies use of other opiates.  Patient denies inhalant use  Patient denies use of benzodiazepines.  Patient denies use of hallucinogens.  Patient denies use of barbiturates, sedatives, or hypnotics.  Patient denies use of over the counter drugs.  Patient denies use of other substances.    No flowsheet data found.    Patient is not concerned about substance use.       Based on the positive CAGE score and clinical interview there  are not indications of drug or alcohol abuse.    Significant Losses / Trauma / Abuse / Neglect Issues:   There are no indications or report of: significant losses, trauma, abuse or neglect    Concerns for possible neglect are not present.     Client reported he has had \"impulsive suicidal thoughts\" in the past if he has felt overwhelmed \"like I don't want to exist anymore.\" He explained that he has addressed these thoughts in therapy, stating, \"We did CBT and I can accepts the thoughts and then let them go. It's nothing I want to act on. They used to be more damaging because I would feel the weight of them " "emotionally but now I don't. I see them as thoughts and they pass.\" He denied current thoughts of being better off dead or wanting to be dead. He denied plans or intent to harm himself.    Safety Assessment:  Current Safety Concerns:  [place] Suicide Severity Rating Scale (Lifetime/Recent)  [place] Suicide Severity Rating (Lifetime/Recent) 2/18/2020   1. Wish to be Dead (Lifetime) Yes   Wish to be Dead Description (Lifetime) Client reported having thoughts of \"I don't want to exist anymore\" in the past when he has felt overwhelmed   1. Wish to be Dead (Recent) No   2. Non-Specific Active Suicidal Thoughts (Lifetime) No   2. Non-Specific Active Suicidal Thoughts (Recent) No     Patient denies current homicidal ideation and behaviors.  Patient denies current self-injurious ideation and behaviors.    Patient denied risk behaviors associated with substance use.  Patient denies any high risk behaviors associated with mental health symptoms.  Patient reports the following current concerns for their personal safety: None.  Patient reports there are no firearms in the house.     History of Safety Concerns:  Patient denied a history of homicidal ideation.     Patient denied a history of self-injurious ideation and behaviors.    Patient denied a history of personal safety concerns.    Patient denied a history of assaultive behaviors.    Patient denied a history of assaultive behaviors.    Patient denied a history of risk behaviors associated with substance use.  Patient denies any history of high risk behaviors associated with mental health symptoms.    Patient reports the following protective factors: forward/future oriented thinking, dedication to family/friends, agreement to use safety plan, living with other people, daily obligations, effective problem-solving skills, committment to well-being, healthy fear of risky behaviors or pain, strong sense of self-worth/esteem, access to a variety of clinical interventions and " pets    See Preliminary Treatment Plan for Safety and Risk Management Plan      Diagnostic Criteria:  A. Excessive anxiety and worry about a number of events or activities (such as work or school performance).   B. The person finds it difficult to control the worry.  C. Select 3 or more symptoms (required for diagnosis). Only one item is required in children.   - Restlessness or feeling keyed up or on edge.    - Being easily fatigued.    - Difficulty concentrating or mind going blank.    - Irritability.    - Muscle tension.   D. The focus of the anxiety and worry is not confined to features of an Axis I disorder.  E. The anxiety, worry, or physical symptoms cause clinically significant distress or impairment in social, occupational, or other important areas of functioning.   F. The disturbance is not due to the direct physiological effects of a substance (e.g., a drug of abuse, a medication) or a general medical condition (e.g., hyperthyroidism) and does not occur exclusively during a Mood Disorder, a Psychotic Disorder, or a Pervasive Developmental Disorder.  A) Recurrent episode(s) - symptoms have been present during the same 2-week period and represent a change from previous functioning 5 or more symptoms (required for diagnosis)   - Depressed mood. Note: In children and adolescents, can be irritable mood.     - Diminished interest or pleasure in all, or almost all, activities.    - Increased sleep.    - Psychomotor activity agitation.    - Fatigue or loss of energy.    - Feelings of worthlessness or inappropriate guilt.    - Diminished ability to think or concentrate, or indecisiveness.   B) The symptoms cause clinically significant distress or impairment in social, occupational, or other important areas of functioning  C) The episode is not attributable to the physiological effects of a substance or to another medical condition  D) The occurence of major depressive episode is not better explained by other  thought / psychotic disorders  E) There has never been a manic episode or hypomanic episode    Functional Status:  Patient's  symptoms have resulted in the following functional impairments: school, work, home    DSM5 Diagnoses: (Sustained by DSM5 Criteria Listed Above)  Diagnoses: 296.32 (F33.1) Major Depressive Disorder, Recurrent Episode, Moderate _  300.02 (F41.1) Generalized Anxiety Disorder   RULE OUT: ADHD  Psychosocial & Contextual Factors: academic stress  WHODAS:   WHODAS 2.0 Total Score 2/18/2020   Total Score 21       Preliminary Treatment Plan:  Plan for Safety and Risk Management:   Recommended that patient call 911 or go to the local ED should there be a change in any of these risk factors.     Collaboration:  Collaboration / coordination of treatment will be initiated with the following support professionals: primary care physician.    Referral to another professional/service is not indicated at this time..  A Release of Information is not needed at this time.       Resources/Service Plan:       services are not indicated.     Modifications to assist communication are not indicated.     Additional disability accomodations are not indicated     Discussed the general effects of drugs and alcohol on health and well-being. Provider gave patient printed information about the effects of chemical use on his health and well being.    Records were reviewed at time of assessment.    Report to child / adult protection services was NA.    Information in this assessment was obtained from the medical record and provided by patient who is a good historian.     Patient will have open access to their mental health medical record.    Estefania Brown, PhD, LP  February 18, 2020

## 2020-02-19 ASSESSMENT — ANXIETY QUESTIONNAIRES: GAD7 TOTAL SCORE: 12

## 2020-02-20 ENCOUNTER — DOCUMENTATION ONLY (OUTPATIENT)
Dept: PSYCHOLOGY | Facility: CLINIC | Age: 25
End: 2020-02-20

## 2020-02-20 NOTE — PROGRESS NOTES
Name: Oni Anton   MRN: [id number]  : 1995    Client completed the [place] Multiphasic Personality Inventory-2 (MMPI-2), a self-report personality inventory, as part of his evaluation. Validity scales indicate that the client responded in an open and consistent manner, resulting in a valid profile. While over-reporting is possible, it is also likely that the Client has limited resources for coping with the stresses and demands of daily life. The following results should be interpreted with caution and in light of other information. His profile reflects a high level of general emotional distress. Individuals with similar profiles tend to experience depression with dysphoria, moodiness, apprehension, loss of interest, pessimism, fatigue, and feelings of apathy and exhaustion. They also experience generalized anxiety, nervous tension, excessive worry, sleep disturbance and problems with attention and concentration. They may experience a sense of mental failure or decline and the depletion of energy needed to accomplish mental work. Thinking and problem-solving are experienced as effortful and as subject to going off course even when significant effort is made. Thinking may be viewed as impaired or unreliable; they may have a sense that  I can t seem to get my mind right.  Individuals with similar profiles likely feel slower, less capable, less intelligent, and less adequate than others.

## 2020-02-26 ENCOUNTER — OFFICE VISIT (OUTPATIENT)
Dept: PSYCHOLOGY | Facility: CLINIC | Age: 25
End: 2020-02-26
Payer: COMMERCIAL

## 2020-02-26 DIAGNOSIS — F41.1 GENERALIZED ANXIETY DISORDER: Primary | ICD-10-CM

## 2020-02-26 DIAGNOSIS — F33.1 MAJOR DEPRESSIVE DISORDER, RECURRENT EPISODE, MODERATE (H): ICD-10-CM

## 2020-02-26 PROCEDURE — 90832 PSYTX W PT 30 MINUTES: CPT | Performed by: PSYCHOLOGIST
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
## 2020-02-27 ENCOUNTER — DOCUMENTATION ONLY (OUTPATIENT)
Dept: PSYCHOLOGY | Facility: CLINIC | Age: 25
End: 2020-02-27

## 2020-02-27 NOTE — PROGRESS NOTES
"Client Name: Oni Anton  MRN: [id number]  : 1995    Livier Adult ADHD Rating Scale-IV: Self and Other Reports (BAARS-IV)  The BAARS-IV assesses for symptoms of ADHD that are experienced in one's daily life. This assessment measure includes self and collateral rating scales designed to provide information regarding current and childhood symptoms of ADHD including inattention, hyperactivity, and impulsivity. Self-report scores are reported as percentiles. Scores at the 76th-83rd percentile are considered marginal, scores at the 84th-92nd percentile are considered borderline, scores at the 93rd-95th percentile are considered mild, scores at the 96th-98th percentile are considered moderate, and those at the 99th percentile are considered severe. Collateral or \"other\" rating scales are reported as number of symptoms observed in comparison to those reported by the client. Norms and percentile scores are not available for collateral reports.      Current Symptoms Scale--Self Report:   Client completed the self-report inventory of current symptoms. The results indicate that the client's Total ADHD Score was 41 which places him in the 93rd percentile for overall ADHD symptoms. In addition, the client endorsed the following occur \"often\" or \"very often\": 4/9 (95th percentile) Inattention symptoms, 2/9 (93rd percentile) Hyperactivity/Impulsivity symptoms, and 1/9 (78th percentile) Sluggish Cognitive Tempo symptoms. Client indicated that the reported symptoms have resulted in impaired functioning in school, work, and social relationships. Overall, the results suggest the client is reporting mild symptoms of inattention at this time.      Current Symptoms Scale--Other Report:  Client's partner completed the collateral report inventory of current symptoms. Based on the collateral contact's observation of symptoms, the client demonstrates the following \"often\" or \"very often\": 0/9 Inattention symptoms, 2/5 " "Hyperactivity symptoms, 1/4 Impulsivity symptoms, and 1/9 Sluggish Cognitive Tempo symptoms. The client's Total ADHD Score was 35. The collateral- and self-report scores are somewhat discrepant; Client s partner noted fewer symptoms of inattention than Client reported.      Childhood Symptoms Scale--Self-Report:  Client completed the self-report inventory of childhood symptoms. The results indicate that the client's Total ADHD Score was 40 which places him in the 87th percentile for overall ADHD symptoms in childhood. In addition, the client endorsed having experienced the following \"often\" or \"very often\": 4/9 (89th percentile) Inattention symptoms and 2/9 (83rd percentile) Hyperactivity-Impulsivity symptoms. Client indicated that the reported symptoms resulted in impaired functioning in school, home, and social relationships. Overall, the results suggest the client reported experiencing borderline symptoms of inattention as a child.     Childhood Symptoms Scale--Other Report:  Client s mother completed the collateral report inventory of childhood symptoms. Based on the collateral contact's recollection of client's childhood symptoms, the client demonstrated the following \"often\" or \"very often\": 3/9 Inattention symptoms and 0/9 Hyperactivity-Impulsivity symptoms. The client's Total ADHD Score was 34. The collateral- and self-report scores are similar.    Livier Functional Impairment Scale: Self and Other Reports (BFIS)  The BFIS is used to assess an individuals' psychosocial impairment in major life/daily activities that may be due to a mental health disorder. This assessment measure includes self and collateral rating scales. Self-report scores are reported as percentiles. Scores at the 76th-83rd percentile are considered marginal, scores at the 84th-92nd percentile are considered borderline, scores at the 93rd-95th percentile are considered mild, scores at the 96th-98th percentile are considered moderate, and " "those at the 99th percentile are considered severe. Collateral or \"other\" rating scales are reported as number of symptoms observed in comparison to those reported by the client. Norms and percentile scores are not available for collateral reports.      Results indicate the client identified impairment (scores at or greater than 93rd percentile) in the following areas: home-family and health maintenance. The client's Mean Impairment Score was 5.14 (90th percentile) indicating the client is reporting borderline impairment in functioning across domains. Client's partner completed the collateral rating scale, which indicated somewhat discrepant results. Her scores were generally higher (e.g., Mean Impairment Score of 5.93).       Livier Deficits in Executive Functioning Scale (BDEFS)  The BDEFS is a measure used for evaluating dimensions of adult executive functioning in daily life. This assessment measure includes self and collateral rating scales. Self-report scores are reported as percentiles. Scores at the 76th-83rd percentile are considered marginal, scores at the 84th-92nd percentile are considered borderline, scores at the 93rd-95th percentile are considered mild, scores at the 96th-98th percentile are considered moderate, and those at the 99th percentile are considered severe. Collateral or \"other\" rating scales are reported as number of symptoms observed in comparison to those reported by the client. Norms and percentile scores are not available for collateral reports.      Results indicate the client's Total Executive Functioning Score was 209 (95th percentile). The ADHD-Executive Functioning Index score was 26 (93rd percentile). These scores suggest the client is reporting mild deficits in executive functioning. Specifically he noted the following: self-management to time (borderline); self-organization/problem-solving (mild); self-motivation (moderate); and self-regulation of emotions (borderline). " Client s partner completed the collateral report which demonstrated discrepant results. Her scores were considerably lower. She noted deficits in self-restraint and self-regulation of emotions.    Generalized Anxiety Disorder Questionnaire (ANAHY-7)  This questionnaire is designed to screen for anxiety in adults. Based on the client's score of 13, he is reporting moderate symptoms of anxiety at this time. Client identified the following symptoms of anxiety: feeling nervous, anxious or on edge; not being able to stop or control worrying, worrying too much about different things, trouble relaxing, being so restless it s hard to sit still, becoming easily annoyed or irritable, and feeling afraid as if something awful might happen.    Patient Health Questionnaire- 9 (PHQ-9)   This questionnaire is designed to screen for depression in adults. Based on the client's score of 16, he is reporting moderately severe symptoms of depression at this time. Symptoms endorsed include: little interest or pleasure in doing things; feeling down/depressed/hopeless;  trouble falling asleep, staying asleep, or sleeping too much; feeling tired or having little energy; poor appetite or overeating; feeling bad about self, poor concentration; feeling fidgety/restless; and thoughts that one would be better off dead or hurting oneself in someway.

## 2020-09-03 ENCOUNTER — MYC MEDICAL ADVICE (OUTPATIENT)
Dept: FAMILY MEDICINE | Facility: CLINIC | Age: 25
End: 2020-09-03

## 2020-09-03 ASSESSMENT — PATIENT HEALTH QUESTIONNAIRE - PHQ9
10. IF YOU CHECKED OFF ANY PROBLEMS, HOW DIFFICULT HAVE THESE PROBLEMS MADE IT FOR YOU TO DO YOUR WORK, TAKE CARE OF THINGS AT HOME, OR GET ALONG WITH OTHER PEOPLE: SOMEWHAT DIFFICULT
SUM OF ALL RESPONSES TO PHQ QUESTIONS 1-9: 17
SUM OF ALL RESPONSES TO PHQ QUESTIONS 1-9: 17

## 2020-09-04 ASSESSMENT — PATIENT HEALTH QUESTIONNAIRE - PHQ9: SUM OF ALL RESPONSES TO PHQ QUESTIONS 1-9: 17

## 2020-09-16 ENCOUNTER — VIRTUAL VISIT (OUTPATIENT)
Dept: FAMILY MEDICINE | Facility: OTHER | Age: 25
End: 2020-09-16
Payer: COMMERCIAL

## 2020-09-16 DIAGNOSIS — Z20.822 SUSPECTED COVID-19 VIRUS INFECTION: ICD-10-CM

## 2020-09-16 DIAGNOSIS — Z20.822 SUSPECTED COVID-19 VIRUS INFECTION: Primary | ICD-10-CM

## 2020-09-16 PROCEDURE — U0003 INFECTIOUS AGENT DETECTION BY NUCLEIC ACID (DNA OR RNA); SEVERE ACUTE RESPIRATORY SYNDROME CORONAVIRUS 2 (SARS-COV-2) (CORONAVIRUS DISEASE [COVID-19]), AMPLIFIED PROBE TECHNIQUE, MAKING USE OF HIGH THROUGHPUT TECHNOLOGIES AS DESCRIBED BY CMS-2020-01-R: HCPCS | Performed by: FAMILY MEDICINE

## 2020-09-16 PROCEDURE — 99421 OL DIG E/M SVC 5-10 MIN: CPT | Performed by: PHYSICIAN ASSISTANT

## 2020-09-16 NOTE — PROGRESS NOTES
"Date: 2020 11:22:01  Clinician: Yesica Martinez  Clinician NPI: [id number]  Patient: Oni Anton  Patient : 1995  Patient Address: [address] [place] [address]  Patient Phone: [phone]  Visit Protocol: URI  Patient Summary:  Oni is a 24 year old ( : 1995 ) male who initiated a [facility] Visit for COVID-19 (Coronavirus) evaluation and screening. When asked the question \"Please sign me up to receive news, health information and promotions from [facility].\", Oni responded \"No\".    Oni states his symptoms started 1-2 days ago.   His symptoms consist of myalgia, a sore throat, and a cough.   Symptom details     Cough: Oni coughs a few times an hour and his cough is not more bothersome at night. Phlegm does not come into his throat when he coughs. He does not believe his cough is caused by post-nasal drip.     Sore throat: Oni reports having mild throat pain (1-3 on a 10 point pain scale), does not have exudate on his tonsils, and can swallow liquids. He is not sure if the lymph nodes in his neck are enlarged. A rash has not appeared on the skin since the sore throat started.      Oni denies having chills, malaise, facial pain or pressure, fever, ear pain, anosmia, vomiting, rhinitis, nausea, wheezing, teeth pain, ageusia, diarrhea, nasal congestion, and headache. He also denies taking antibiotic medication in the past month and having recent facial or sinus surgery in the past 60 days. He is not experiencing dyspnea.   Precipitating events  Oni is not sure if he has been exposed to someone with strep throat. He has not recently been exposed to someone with influenza. Oni has been in close contact with the following high risk individuals: children under the age of 5.   Pertinent COVID-19 (Coronavirus) information  In the past 14 days, Oni has not worked in a congregate living setting.   He does not work or volunteer as healthcare worker or a  and " does not work or volunteer in a healthcare facility.   Oni also has not lived in a congregate living setting in the past 14 days. He does not live with a healthcare worker.   Oni has not had a close contact with a laboratory-confirmed COVID-19 patient within 14 days of symptom onset.   Since December 2019, Oni and has not had upper respiratory infection or influenza-like illness. Has not been diagnosed with lab-confirmed COVID-19 test   Pertinent medical history  Oni does not need a return to work/school note.   Weight: 220 lbs   Oni does not smoke or use smokeless tobacco.   Weight: 220 lbs    MEDICATIONS: No current medications, ALLERGIES: NKDA  Clinician Response:  Dear Oni,   Your symptoms show that you may have coronavirus (COVID-19). This illness can cause fever, cough and trouble breathing. Many people get a mild case and get better on their own. Some people can get very sick.  What should I do?  We would like to test you for this virus.   1. Please call [phone] to schedule your visit. Explain that you were referred by [facility] to have a COVID-19 test. Be ready to share your [facility] visit ID number.  The following will serve as your written order for this COVID Test, ordered by me, for the indication of suspected COVID [Z20.828]: The test will be ordered in [employer], our electronic health record, after you are scheduled. It will show as ordered and authorized by Errol Begum MD.  Order: COVID-19 (Coronavirus) PCR for SYMPTOMATIC testing from [facility].      2. When it's time for your COVID test:  Stay at least 6 feet away from others. (If someone will drive you to your test, stay in the backseat, as far away from the  as you can.)   Cover your mouth and nose with a mask, tissue or washcloth.  Go straight to the testing site. Don't make any stops on the way there or back.      3.Starting now: Stay home and away from others (self-isolate) until:   You've had no fever---and no medicine that reduces  "fever---for one full day (24 hours). And...   Your other symptoms have gotten better. For example, your cough or breathing has improved. And...   At least 10 days have passed since your symptoms started.       During this time, don't leave the house except for testing or medical care.   Stay in your own room, even for meals. Use your own bathroom if you can.   Stay away from others in your home. No hugging, kissing or shaking hands. No visitors.  Don't go to work, school or anywhere else.    Clean \"high touch\" surfaces often (doorknobs, counters, handles, etc.). Use a household cleaning spray or wipes. You'll find a full list of  on the EPA website: www.epa.gov/pesticide-registration/list-n-disinfectants-use-against-sars-cov-2.   Cover your mouth and nose with a mask, tissue or washcloth to avoid spreading germs.  Wash your hands and face often. Use soap and water.  Caregivers in these groups are at risk for severe illness due to COVID-19:  o People 65 years and older  o People who live in a nursing home or long-term care facility  o People with chronic disease (lung, heart, cancer, diabetes, kidney, liver, immunologic)  o People who have a weakened immune system, including those who:   Are in cancer treatment  Take medicine that weakens the immune system, such as corticosteroids  Had a bone marrow or organ transplant  Have an immune deficiency  Have poorly controlled HIV or AIDS  Are obese (body mass index of 40 or higher)  Smoke regularly   o Caregivers should wear gloves while washing dishes, handling laundry and cleaning bedrooms and bathrooms.  o Use caution when washing and drying laundry: Don't shake dirty laundry, and use the warmest water setting that you can.  o For more tips, go to www.cdc.gov/coronavirus/2019-ncov/downloads/10Things.pdf.    4.Sign up for [employer]. We know it's scary to hear that you might have COVID-19. We want to track your symptoms to make sure you're okay over the next 2 " weeks. Please look for an email from [employer]---this is a free, online program that we'll use to keep in touch. To sign up, follow the link in the email. Learn more at [email or web address]  How can I take care of myself?   Get lots of rest. Drink extra fluids (unless a doctor has told you not to).   Take Tylenol (acetaminophen) for fever or pain. If you have liver or kidney problems, ask your family doctor if it's okay to take Tylenol.   Adults can take either:    650 mg (two 325 mg pills) every 4 to 6 hours, or...   1,000 mg (two 500 mg pills) every 8 hours as needed.    Note: Don't take more than 3,000 mg in one day. Acetaminophen is found in many medicines (both prescribed and over-the-counter medicines). Read all labels to be sure you don't take too much.   For children, check the Tylenol bottle for the right dose. The dose is based on the child's age or weight.    If you have other health problems (like cancer, heart failure, an organ transplant or severe kidney disease): Call your specialty clinic if you don't feel better in the next 2 days.       Know when to call 911. Emergency warning signs include:    Trouble breathing or shortness of breath Pain or pressure in the chest that doesn't go away Feeling confused like you haven't felt before, or not being able to wake up Bluish-colored lips or face.  Where can I get more information?   [facility] -- About COVID-19: [email or web address]   CDC -- What to Do If You're Sick: www.cdc.gov/coronavirus/2019-ncov/about/steps-when-sick.html   CDC -- Ending Home Isolation: www.cdc.gov/coronavirus/2019-ncov/hcp/disposition-in-home-patients.html   CDC -- Caring for Someone: www.cdc.gov/coronavirus/2019-ncov/if-you-are-sick/care-for-someone.html   [facility] -- Interim Guidance for Hospital Discharge to Home: www.health.[facility].mn./diseases/coronavirus/hcp/hospdischarge.pdf   [facility] clinical trials (COVID-19 research studies):  clinicalaffairs.[facility]-clinical-trials    Below are the COVID-19 hotlines at the [place] Department of Health ([facility]). Interpreters are available.    For health questions: Call 717-768-7958 or 1-127.666.8948 (7 a.m. to 7 p.m.) For questions about schools and childcare: Call 546-130-5273 or 1-311.732.4952 (7 a.m. to 7 p.m.)    Diagnosis: Cough  Diagnosis ICD: R05

## 2020-09-17 LAB
SARS-COV-2 RNA SPEC QL NAA+PROBE: NOT DETECTED
SPECIMEN SOURCE: NORMAL

## 2021-01-15 ENCOUNTER — HEALTH MAINTENANCE LETTER (OUTPATIENT)
Age: 26
End: 2021-01-15

## 2021-02-25 ENCOUNTER — MYC MEDICAL ADVICE (OUTPATIENT)
Dept: FAMILY MEDICINE | Facility: CLINIC | Age: 26
End: 2021-02-25

## 2021-07-21 ASSESSMENT — PATIENT HEALTH QUESTIONNAIRE - PHQ9
SUM OF ALL RESPONSES TO PHQ QUESTIONS 1-9: 10
SUM OF ALL RESPONSES TO PHQ QUESTIONS 1-9: 10
10. IF YOU CHECKED OFF ANY PROBLEMS, HOW DIFFICULT HAVE THESE PROBLEMS MADE IT FOR YOU TO DO YOUR WORK, TAKE CARE OF THINGS AT HOME, OR GET ALONG WITH OTHER PEOPLE: SOMEWHAT DIFFICULT

## 2021-07-22 ASSESSMENT — PATIENT HEALTH QUESTIONNAIRE - PHQ9: SUM OF ALL RESPONSES TO PHQ QUESTIONS 1-9: 10

## 2021-07-23 ENCOUNTER — OFFICE VISIT (OUTPATIENT)
Dept: URGENT CARE | Facility: URGENT CARE | Age: 26
End: 2021-07-23
Payer: COMMERCIAL

## 2021-07-23 ENCOUNTER — E-VISIT (OUTPATIENT)
Dept: URGENT CARE | Facility: URGENT CARE | Age: 26
End: 2021-07-23
Payer: COMMERCIAL

## 2021-07-23 VITALS
SYSTOLIC BLOOD PRESSURE: 132 MMHG | TEMPERATURE: 98.4 F | WEIGHT: 210 LBS | DIASTOLIC BLOOD PRESSURE: 86 MMHG | BODY MASS INDEX: 26.96 KG/M2 | OXYGEN SATURATION: 97 % | HEART RATE: 86 BPM

## 2021-07-23 DIAGNOSIS — L72.3 INFECTED SEBACEOUS CYST: Primary | ICD-10-CM

## 2021-07-23 DIAGNOSIS — L08.9 INFECTED SEBACEOUS CYST: Primary | ICD-10-CM

## 2021-07-23 DIAGNOSIS — L02.419 ABSCESS, AXILLA: Primary | ICD-10-CM

## 2021-07-23 PROCEDURE — 10060 I&D ABSCESS SIMPLE/SINGLE: CPT | Performed by: FAMILY MEDICINE

## 2021-07-23 NOTE — PATIENT INSTRUCTIONS
Dear Oni Anton,    We are sorry you are not feeling well. Based on the responses you provided, it is recommended that you be seen in-person in urgent care so we can better evaluate your symptoms. Please click here to find the nearest urgent care location to you.   You will not be charged for this Visit. Thank you for trusting us with your care.    Pallavi Estrella

## 2021-07-23 NOTE — PROGRESS NOTES
Subjective: About a week ago he noticed his right upper axillary area had a lump that was slowly getting bigger and painful.  He tried to empty it by sticking a pin in it but got nothing out.  He said he did that one other time and it worked.    Objective: In the right axillary area is a 2 or 3 cm tender red fluctuant mass.  After local Xylocaine with epinephrine I opened it up and got 2 or 3 cm of pus and blood out of it.  That completely deflated.  I put on a pressure bandage.    Assessment and plan: Incision and drainage of right axillary inflamed sebaceous cyst.  He would like to avoid antibiotics so I printed it out and if things get better on their own he does not need it.  Try to keep it open and draining.

## 2021-07-23 NOTE — TELEPHONE ENCOUNTER
JF,    Pt responded 7/21/21 to [facility] PHQ9 questionnaire from 2/25/21.  #9 answered several days (also did 9/30/20).  Score 10 on 7/21 was 17 on 9/3.    I sent a message to pt as well.    Please advise.  Thanks,  Bernadine López RN            PHQ 2/18/2020 9/3/2020 7/21/2021   PHQ-9 Total Score 14 17 10   Q9: Thoughts of better off dead/self-harm past 2 weeks Several days Several days Several days   F/U: Thoughts of suicide or self-harm - No No   F/U: Safety concerns - No No

## 2021-07-23 NOTE — TELEPHONE ENCOUNTER
I believe [facility] is out today -   Please call patient and advise him to go to [facility] or [facility] - if a plan   Thanks  PN

## 2021-08-30 NOTE — TELEPHONE ENCOUNTER
JF,  See last "[employer] message below.  Did you want any other f/u or ok to close?  Please advise.  Thanks,  Bernadine López RN

## 2021-10-24 ENCOUNTER — HEALTH MAINTENANCE LETTER (OUTPATIENT)
Age: 26
End: 2021-10-24

## 2022-02-13 ENCOUNTER — HEALTH MAINTENANCE LETTER (OUTPATIENT)
Age: 27
End: 2022-02-13

## 2022-10-16 ENCOUNTER — HEALTH MAINTENANCE LETTER (OUTPATIENT)
Age: 27
End: 2022-10-16

## 2023-03-26 ENCOUNTER — HEALTH MAINTENANCE LETTER (OUTPATIENT)
Age: 28
End: 2023-03-26

## 2023-10-30 ENCOUNTER — OFFICE VISIT (OUTPATIENT)
Dept: FAMILY MEDICINE | Facility: CLINIC | Age: 28
End: 2023-10-30
Payer: COMMERCIAL

## 2023-10-30 VITALS
BODY MASS INDEX: 28.74 KG/M2 | OXYGEN SATURATION: 97 % | RESPIRATION RATE: 17 BRPM | TEMPERATURE: 97.5 F | HEIGHT: 76 IN | WEIGHT: 236 LBS | SYSTOLIC BLOOD PRESSURE: 129 MMHG | HEART RATE: 65 BPM | DIASTOLIC BLOOD PRESSURE: 81 MMHG

## 2023-10-30 DIAGNOSIS — R63.5 WEIGHT GAIN: ICD-10-CM

## 2023-10-30 DIAGNOSIS — Z00.00 ROUTINE GENERAL MEDICAL EXAMINATION AT A HEALTH CARE FACILITY: Primary | ICD-10-CM

## 2023-10-30 DIAGNOSIS — Z13.228 SCREENING FOR METABOLIC DISORDER: ICD-10-CM

## 2023-10-30 DIAGNOSIS — Z13.220 SCREENING FOR LIPID DISORDERS: ICD-10-CM

## 2023-10-30 DIAGNOSIS — Z13.1 SCREENING FOR DIABETES MELLITUS: ICD-10-CM

## 2023-10-30 DIAGNOSIS — Z23 NEEDS FLU SHOT: ICD-10-CM

## 2023-10-30 LAB
HBA1C MFR BLD: 5.7 %
HBA1C MFR BLD: 5.7 %

## 2023-10-30 PROCEDURE — 80061 LIPID PANEL: CPT | Mod: ORL | Performed by: FAMILY MEDICINE

## 2023-10-30 PROCEDURE — 80053 COMPREHEN METABOLIC PANEL: CPT | Mod: ORL | Performed by: FAMILY MEDICINE

## 2023-10-30 PROCEDURE — 83036 HEMOGLOBIN GLYCOSYLATED A1C: CPT | Mod: ORL | Performed by: FAMILY MEDICINE

## 2023-10-30 PROCEDURE — 84443 ASSAY THYROID STIM HORMONE: CPT | Mod: ORL | Performed by: FAMILY MEDICINE

## 2023-10-30 ASSESSMENT — PATIENT HEALTH QUESTIONNAIRE - PHQ9
SUM OF ALL RESPONSES TO PHQ QUESTIONS 1-9: 5
5. POOR APPETITE OR OVEREATING: NOT AT ALL

## 2023-10-30 ASSESSMENT — ANXIETY QUESTIONNAIRES
1. FEELING NERVOUS, ANXIOUS, OR ON EDGE: NOT AT ALL
7. FEELING AFRAID AS IF SOMETHING AWFUL MIGHT HAPPEN: SEVERAL DAYS
GAD7 TOTAL SCORE: 3
5. BEING SO RESTLESS THAT IT IS HARD TO SIT STILL: NOT AT ALL
3. WORRYING TOO MUCH ABOUT DIFFERENT THINGS: NOT AT ALL
IF YOU CHECKED OFF ANY PROBLEMS ON THIS QUESTIONNAIRE, HOW DIFFICULT HAVE THESE PROBLEMS MADE IT FOR YOU TO DO YOUR WORK, TAKE CARE OF THINGS AT HOME, OR GET ALONG WITH OTHER PEOPLE: NOT DIFFICULT AT ALL
6. BECOMING EASILY ANNOYED OR IRRITABLE: MORE THAN HALF THE DAYS
2. NOT BEING ABLE TO STOP OR CONTROL WORRYING: NOT AT ALL
GAD7 TOTAL SCORE: 3

## 2023-10-30 NOTE — NURSING NOTE
"28 year old  Chief Complaint   Patient presents with    Physical     Daytime lethargy. Compensates with caffeine, but trying to avoid over use especially in afternoons. Able to get work done but feels like a big effort.       Blood pressure (!) 158/97, pulse 73, temperature 97.5  F (36.4  C), temperature source Temporal, resp. rate 17, height 1.93 m (6' 4\"), weight 107 kg (236 lb), SpO2 97%. Body mass index is 28.73 kg/m .  Patient Active Problem List   Diagnosis    CARDIOVASCULAR SCREENING; LDL GOAL LESS THAN 160    Attention deficit disorder    Major depressive disorder, recurrent episode, moderate (H)    Cervicalgia    Right-sided thoracic back pain    Episodic tension-type headache, not intractable    Right shoulder pain    ANAHY (generalized anxiety disorder)       Wt Readings from Last 2 Encounters:   10/30/23 107 kg (236 lb)   07/23/21 95.3 kg (210 lb)     BP Readings from Last 3 Encounters:   10/30/23 (!) 158/97   07/23/21 132/86   12/17/19 (!) 138/94         Current Outpatient Medications   Medication    vilazodone (VIIBRYD) 10 MG TABS tablet     No current facility-administered medications for this visit.       Social History     Tobacco Use    Smoking status: Never    Smokeless tobacco: Never   Substance Use Topics    Alcohol use: Yes     Comment: 5-10    Drug use: No       Health Maintenance Due   Topic Date Due    ADVANCE CARE PLANNING  Never done    PHQ-9  08/25/2021    INFLUENZA VACCINE (1) 09/01/2023    COVID-19 Vaccine (4 - 2023-24 season) 09/01/2023       No results found for: \"PAP\"      October 30, 2023 4:11 PM    "

## 2023-10-30 NOTE — PATIENT INSTRUCTIONS
IMPRESSION  First visit for Oni, a 27 yo who is generally healthy. Has had a 25 lbs weight gain over the past 2 years and feelings of some lethargy    ASSESSMENT/PLAN:    Annual Exam/Preventive Issues   -Check Lipids, comp panel and A1C  - Give flu vaccine today.   - He wants to defer the covid vaccine for now    -Specific concerns:     Lethargy with 25 lbs weight gain    -Check TSH and A1C  - Discussed strategies for weight loss including eating earlier in the evening and then having a breakfast with protein and green veggies.   - Increase exercise  - Try to stand during work day  - Goal of 1 lb weight loss per week until around 210- 215 lbs.   - Consider further evaluation if symptoms persist or worsen    -Follow up: as needed or in a year    North Garay MD  Family Medicine and Sports Medicine      Preventive Health Recommendations  Male Ages 26 - 39    Yearly exam:             See your health care provider every year in order to  o   Review health changes.   o   Discuss preventive care.    o   Review your medicines if your doctor has prescribed any.  You should be tested each year for STDs (sexually transmitted diseases), if you re at risk.   After age 35, talk to your provider about cholesterol testing. If you are at risk for heart disease, have your cholesterol tested at least every 5 years.   If you are at risk for diabetes, you should have a diabetes test (fasting glucose).  Shots: Get a flu shot each year. Get a tetanus shot every 10 years.     Nutrition:  Eat at least 5 servings of fruits and vegetables daily.   Eat whole-grain bread, whole-wheat pasta and brown rice instead of white grains and rice.   Get adequate Calcium and Vitamin D.     Lifestyle  Exercise for at least 150 minutes a week (30 minutes a day, 5 days a week). This will help you control your weight and prevent disease.   Limit alcohol to one drink per day.   No smoking.   Wear sunscreen to prevent skin cancer.   See your dentist every  six months for an exam and cleaning.

## 2023-10-30 NOTE — PROGRESS NOTES
"Oni Anton presents to [facility] today to have an annual exam.     IMPRESSION  First visit for Oni, a 27 yo who is generally healthy. Has had a 25 lbs weight gain over the past 2 years and feelings of some lethargy    ASSESSMENT/PLAN:    Annual Exam/Preventive Issues   -Check Lipids, comp panel and A1C  - Give flu vaccine today.   - He wants to defer the covid vaccine for now    -Specific concerns:     Lethargy with 25 lbs weight gain    -Check TSH and A1C  - Discussed strategies for weight loss including eating earlier in the evening and then having a breakfast with protein and green veggies.   - Increase exercise  - Try to stand during work day  - Goal of 1 lb weight loss per week until around 210- 215 lbs.   - Consider further evaluation if symptoms persist or worsen    -Follow up: as needed or in a year    North Garay MD  Family Medicine and Sports Medicine    Introduction: This is Oni's first visit to our clinic. He's here for annual exam.   \"Hasn't been to a doctor in years and now with health insurance.\"     Feeling overall well except for daytime lethargy during work. Work is 1/2 at home and 1/2 in office. He can't tell if it's due to boredom at work.     He's been using caffeine to compensate. He's having about 2 coffee drinks every morning.      Has had some mental health issues in past. Sometimes bothered by anxiety and depression but it's much better than in the past.   Was in therapy for years and now has strong coping mechanisms.      Patient Active Problem List   Diagnosis    CARDIOVASCULAR SCREENING; LDL GOAL LESS THAN 160    Attention deficit disorder    Major depressive disorder, recurrent episode, moderate (H)    Cervicalgia    Right-sided thoracic back pain    Episodic tension-type headache, not intractable    Right shoulder pain    ANAHY (generalized anxiety disorder)       Current Medications include:   Current Outpatient Medications   Medication Sig Dispense Refill    " "vilazodone (VIIBRYD) 10 MG TABS tablet Take 1 tablet (10 mg) by mouth daily 30 tablet 0     Allergies   Allergen Reactions    Cats Itching     Eyes, sneezing.       Social  Social History     Social History Narrative    From [place]. Went to [employer] but then dropped out of school for a while. 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     Now  at [employer].     Has been with same girlfriend, Casandra for past several years.     For fun, enjoys video games, live music (e.g. [address]), his dog, and reads.          Lifestyle habits and Preventive health issues:     Physical activity Walks about 30 mins/day. Also just restarted lifting weights.   Diet is \"OK\".   Doesn't eat much breakfast. Then, has yogurt or cereal.   Lunch is generally a salad or Chiptotle.   Dinner is wide range. They cook at home a lot.   Occasional late night eating.     Does not have Sugar sweetened beverages.   Alcohol intake involves about 1-2 nights/week and 1-3 drinks/night.    He does not use tobacco products.   His sleep is \"OK\".       MALE ROS  Partner: Casandra  Contraception: OCPs  STD concerns: none    Dental -- has had recent visit.     ROS  PHQ-2 Score:         11/23/2018     2:19 PM 11/23/2018     2:18 PM   PHQ-2 ( 1999 Pfizer)   Q1: Little interest or pleasure in doing things 2    Q2: Feeling down, depressed or hopeless 2    PHQ-2 Score 4    PHQ-2 Total Score (12-17 Years)- Positive if 3 or more points; Administer PHQ-A if positive 4    Q1: Little interest or pleasure in doing things  More than half the days   Q2: Feeling down, depressed or hopeless  More than half the days   PHQ-2 Score  4     PHQ9 at 5. No feelings of self harm.   GAD7 at 3.       Major other issues mentioned above. Otherwise, see patient intake questionnaire.   CONSTITUTIONAL: * Feeling fatigued. NEGATIVE for fever, chills, change in weight  INTEGUMENTARY/SKIN: NEGATIVE for worrisome rashes, moles " or lesions  EYES: NEGATIVE for vision changes or irritation  ENT/MOUTH: NEGATIVE for ear, mouth and throat problems  RESP:NEGATIVE for significant cough or SOB  CV: NEGATIVE for chest pain, palpitations, DAVILA, orthopnea, PND  or peripheral edema  GI: NEGATIVE for nausea, abdominal pain, heartburn, or change in bowel habits  :NEGATIVE for frequency, dysuria, or hematuria  MUSCULOSKELETAL:NEGATIVE for significant arthralgias or myalgia  NEURO: NEGATIVE for weakness, dizziness or paresthesias  ENDOCRINE: NEGATIVE for polyuria/dipsia,  temperature intolerance, skin/hair changes  HEME/ALLERGY/IMMUNE: NEGATIVE for bleeding problems  PSYCHIATRIC: NEGATIVE for changes in mood or affect        Health Maintenance   Topic Date Due    ADVANCE CARE PLANNING  Never done    PHQ-9  08/25/2021    INFLUENZA VACCINE (1) 09/01/2023    COVID-19 Vaccine (4 - 2023-24 season) 09/01/2023    YEARLY PREVENTIVE VISIT  10/30/2024    DTAP/TDAP/TD IMMUNIZATION (7 - Td or Tdap) 06/24/2029    HEPATITIS C SCREENING  Completed    HIV SCREENING  Completed    DEPRESSION ACTION PLAN  Completed    HPV IMMUNIZATION  Completed    MENINGITIS IMMUNIZATION  Completed    HEPATITIS B IMMUNIZATION  Completed    Pneumococcal Vaccine: Pediatrics (0 to 5 Years) and At-Risk Patients (6 to 64 Years)  Aged Out    IPV IMMUNIZATION  Aged Out         Past Surgical History:   Procedure Laterality Date    ZZC PE TUBE         Family History   Problem Relation Age of Onset    Diabetes Paternal Grandmother     Hypertension Paternal Grandfather     Lipids Paternal Grandfather     Myocardial Infarction Maternal Grandfather 47    Lipids Father     Depression Father     Attention Deficit Disorder Father          Immunizations are as follows:      Immunization History   Administered Date(s) Administered    COVID-19 MONOVALENT 12+ (Pfizer) 04/05/2021, 04/26/2021    COVID-19 Monovalent 18+ (Moderna) 12/23/2021    DTAP (<7y) 1994, 02/01/1995, 04/18/1995, 02/19/1996    DTP-Hib  "1995, 02/01/1995, 04/18/1995, 02/19/1996    FLU 6-35 months 11/13/2012    HIB (PRP-T) 1995, 02/01/1995, 04/18/1995, 02/19/1996    HPV 07/06/2012, 09/10/2012, 07/12/2013    HPV Quadrivalent 07/06/2012, 09/10/2012, 07/12/2013    HepB [birth year], 04/30/1996    Hepatitis B, Peds 1995, 1995, 04/30/1996    Influenza (IIV3) PF 11/15/2006, 10/16/2009, 10/26/2010, 11/13/2012    Influenza Vaccine >6 months (Alfuria,Fluzone) 09/24/2014, 11/30/2017    Influenza,INJ,MDCK,PF,Quad >6mo(Flucelvax) 10/08/2020, 12/23/2021    MMR 02/19/1996, 08/05/2010    Meningococcal ACWY (Menactra ) 07/12/2013    Meningococcal ACWY (Menveo ) 07/12/2013    OPV, trivalent, live 1994, 02/01/1995, 02/19/1996    Poliovirus, inactivated (IPV) 1994, 02/01/1995, 02/19/1996    TDAP Vaccine (Adacel) 05/25/2007, 06/24/2019    Varicella 06/25/1999, 08/05/2010         EXAM  /81 (BP Location: Left arm, Patient Position: Sitting, Cuff Size: Adult Large)   Pulse 65   Temp 97.5  F (36.4  C) (Temporal)   Resp 17   Ht 1.93 m (6' 4\")   Wt 107 kg (236 lb)   SpO2 97%   BMI 28.73 kg/m      GENERAL APPEARANCE: Appears well.   HEENT: Tms are normal. Neck is supple. No adenopathy, thyroid normal to palpation  RESP: Lungs clear to auscultation bilaterally.  Axillae: no palpable axillary masses or adenopathy  CV: regular rate and rhythm, normal S1 S2, no murmur, no carotid bruits  ABDOMEN: soft, nontender, without HSM or masses. Bowel sounds normal  : Deferred. Reported no concerns.   Rectal exam: deferred  MS: Gait - Normal. Extremities with generally normal range of motion.   SKIN: No suspicious lesions or rashes  NEURO: Normal strength and tone, sensory exam grossly normal  PSYCH: mentation and affect appear normal.   EXT: No peripheral edema      SEE TOP OF NOTE FOR ASSESSMENT AND PLAN      North Garay MD  Family Medicine and Sports Medicine  [facility] Department of Family Medicine and " Community [facility]

## 2023-10-31 LAB
ALBUMIN SERPL BCG-MCNC: 4.9 G/DL (ref 3.5–5.2)
ALP SERPL-CCNC: 94 U/L (ref 40–129)
ALT SERPL W P-5'-P-CCNC: 76 U/L (ref 0–70)
ANION GAP SERPL CALCULATED.3IONS-SCNC: 13 MMOL/L (ref 7–15)
AST SERPL W P-5'-P-CCNC: 155 U/L (ref 0–45)
BILIRUB SERPL-MCNC: 0.5 MG/DL
BUN SERPL-MCNC: 8.6 MG/DL (ref 6–20)
CALCIUM SERPL-MCNC: 10.4 MG/DL (ref 8.6–10)
CHLORIDE SERPL-SCNC: 101 MMOL/L (ref 98–107)
CHOLEST SERPL-MCNC: 266 MG/DL
CREAT SERPL-MCNC: 1.05 MG/DL (ref 0.67–1.17)
DEPRECATED HCO3 PLAS-SCNC: 25 MMOL/L (ref 22–29)
EGFRCR SERPLBLD CKD-EPI 2021: >90 ML/MIN/1.73M2
GLUCOSE SERPL-MCNC: 86 MG/DL (ref 70–99)
HDLC SERPL-MCNC: 32 MG/DL
LDLC SERPL CALC-MCNC: 195 MG/DL
NONHDLC SERPL-MCNC: 234 MG/DL
POTASSIUM SERPL-SCNC: 4.3 MMOL/L (ref 3.4–5.3)
PROT SERPL-MCNC: 8.2 G/DL (ref 6.4–8.3)
SODIUM SERPL-SCNC: 139 MMOL/L (ref 135–145)
TRIGL SERPL-MCNC: 193 MG/DL
TSH SERPL DL<=0.005 MIU/L-ACNC: 0.95 UIU/ML (ref 0.3–4.2)

## 2023-12-04 ENCOUNTER — OFFICE VISIT (OUTPATIENT)
Dept: FAMILY MEDICINE | Facility: CLINIC | Age: 28
End: 2023-12-04
Payer: COMMERCIAL

## 2023-12-04 VITALS
TEMPERATURE: 97.8 F | SYSTOLIC BLOOD PRESSURE: 136 MMHG | WEIGHT: 220.5 LBS | BODY MASS INDEX: 26.84 KG/M2 | DIASTOLIC BLOOD PRESSURE: 85 MMHG | RESPIRATION RATE: 17 BRPM | OXYGEN SATURATION: 98 % | HEART RATE: 67 BPM

## 2023-12-04 DIAGNOSIS — R74.8 ELEVATED LIVER ENZYMES: Primary | ICD-10-CM

## 2023-12-04 DIAGNOSIS — E78.00 HYPERCHOLESTEREMIA: ICD-10-CM

## 2023-12-04 NOTE — NURSING NOTE
"28 year old  Chief Complaint   Patient presents with    Follow Up       Blood pressure 136/85, pulse 67, temperature 97.8  F (36.6  C), temperature source Temporal, resp. rate 17, weight 100 kg (220 lb 8 oz), SpO2 98%. Body mass index is 26.84 kg/m .  Patient Active Problem List   Diagnosis    CARDIOVASCULAR SCREENING; LDL GOAL LESS THAN 160    Attention deficit disorder    Major depressive disorder, recurrent episode, moderate (H)    Cervicalgia    Right-sided thoracic back pain    Episodic tension-type headache, not intractable    Right shoulder pain    ANAHY (generalized anxiety disorder)       Wt Readings from Last 2 Encounters:   12/04/23 100 kg (220 lb 8 oz)   10/30/23 107 kg (236 lb)     BP Readings from Last 3 Encounters:   12/04/23 136/85   10/30/23 129/81   07/23/21 132/86         Current Outpatient Medications   Medication    vilazodone (VIIBRYD) 10 MG TABS tablet     No current facility-administered medications for this visit.       Social History     Tobacco Use    Smoking status: Never    Smokeless tobacco: Never   Substance Use Topics    Alcohol use: Yes     Comment: 5-10    Drug use: No       Health Maintenance Due   Topic Date Due    ADVANCE CARE PLANNING  Never done    IPV IMMUNIZATION (5 of 5 - 5-dose series) 10/14/1998    COVID-19 Vaccine (4 - 2023-24 season) 09/01/2023       No results found for: \"PAP\"      December 4, 2023 3:50 PM    "

## 2023-12-04 NOTE — PROGRESS NOTES
Medical assistant intake:  Oni Anton is a 28 year old male who presents to [facility] today for Follow Up       -  ASSESSMENT and PLAN:    - Oni is a 27 yo who is here for follow up of abnormal labs with elevated cholesterol levels, liver function tests and calcium.   He's made numerous lifestyle changes and has lost about 16 lbs and appears far less lethargic.     Encouraged to continue with healthy lifestyle including minimal alcohol  Asked to come in for fasting labs in about 1 month and then follow up with me after the labs.   Labs ordered today  When you come in for labs, please avoid eating for about 12 hours. Also, hydrate with some water prior to your lab        Follow-up 4-6 weeks    North Garay MD  Family Medicine and Sports Medicine  [facility]      SUBJECTIVE:   Oni is here for follow up of his visit and labs from a few weeks ago.      At that visit he reported that he had gained about 25 pounds over the past 2 years and he was feeling lethargic.    His labs were noteworthy for an AST at 155 and ALT at 76.  He now admits that he was drinking more than he had reported.  Also, his cholesterol was elevated  at 266 and his LDL was very elevated at 195.  His HDL was low at 32.  His calcium was slightly elevated at 10.4.   His TSH was normal.      I sent him a message with these results and made some suggestions and asked him to follow-up.  Fortunately, he has now made major changes in diet.   Now eating lots and lots of veggies. Also, eating Chicken. No snacking. Smaller portions.   Still not eating a large breakfast.    Exercise has increased whereby he's getting HR to 140-150 for 30 minutes plus some strength training.   Doing this 4-5 times/week.     Has also decreased alcohol. Now only had one episode of binge drinking in past 6 weeks.     Has lost about 16 lbs in past 6 weeks.     Review Of Systems:     he is feeling more energetic.      Problem list per EMR:  Patient Active  Problem List   Diagnosis    CARDIOVASCULAR SCREENING; LDL GOAL LESS THAN 160    Attention deficit disorder    Major depressive disorder, recurrent episode, moderate (H)    Cervicalgia    Right-sided thoracic back pain    Episodic tension-type headache, not intractable    Right shoulder pain    ANAHY (generalized anxiety disorder)       No current outpatient medications on file.       Allergies   Allergen Reactions    Cats Itching     Eyes, sneezing.          OBJECTIVE    Vitals: /85 (BP Location: Left arm, Patient Position: Sitting, Cuff Size: Adult Large)   Pulse 67   Temp 97.8  F (36.6  C) (Temporal)   Resp 17   Wt 100 kg (220 lb 8 oz)   SpO2 98%   BMI 26.84 kg/m    BMI= Body mass index is 26.84 kg/m .   He appears well.  His weight is down 16 pounds from his visit 6 weeks ago.  His  pulse is also lower today than it was previously.    SEE TOP OF NOTE FOR ASSESSMENT AND PLAN    --North Garay MD  [facility], Department of Family Medicine and Community Health

## 2023-12-04 NOTE — PATIENT INSTRUCTIONS
-  ASSESSMENT and PLAN:    - Oni is a 27 yo who is here for follow up of abnormal labs with elevated cholesterol levels, liver function tests and calcium.   He's made numerous lifestyle changes and has lost about 16 lbs and appears far less lethargic.     Encouraged to continue with healthy lifestyle including minimal alcohol  Asked to come in for fasting labs in about 1 month and then follow up with me after the labs.   Labs ordered today  When you come in for labs, please avoid eating for about 12 hours. Also, hydrate with some water prior to your lab        Follow-up 4-6 weeks    North Garay MD  Family Medicine and Sports Medicine  [facility]

## 2024-01-15 ENCOUNTER — LAB (OUTPATIENT)
Dept: LAB | Facility: CLINIC | Age: 29
End: 2024-01-15
Payer: COMMERCIAL

## 2024-01-15 DIAGNOSIS — E78.00 HYPERCHOLESTEREMIA: ICD-10-CM

## 2024-01-15 DIAGNOSIS — R74.8 ELEVATED LIVER ENZYMES: ICD-10-CM

## 2024-01-15 LAB
ALBUMIN SERPL BCG-MCNC: 4.7 G/DL (ref 3.5–5.2)
ALP SERPL-CCNC: 89 U/L (ref 40–150)
ALT SERPL W P-5'-P-CCNC: 16 U/L (ref 0–70)
ANION GAP SERPL CALCULATED.3IONS-SCNC: 11 MMOL/L (ref 7–15)
AST SERPL W P-5'-P-CCNC: 18 U/L (ref 0–45)
BILIRUB SERPL-MCNC: 0.6 MG/DL
BUN SERPL-MCNC: 10.1 MG/DL (ref 6–20)
CALCIUM SERPL-MCNC: 10.1 MG/DL (ref 8.6–10)
CHLORIDE SERPL-SCNC: 105 MMOL/L (ref 98–107)
CHOLEST SERPL-MCNC: 216 MG/DL
CREAT SERPL-MCNC: 0.98 MG/DL (ref 0.67–1.17)
DEPRECATED HCO3 PLAS-SCNC: 25 MMOL/L (ref 22–29)
EGFRCR SERPLBLD CKD-EPI 2021: >90 ML/MIN/1.73M2
FASTING STATUS PATIENT QL REPORTED: YES
GLUCOSE SERPL-MCNC: 79 MG/DL (ref 70–99)
HDLC SERPL-MCNC: 30 MG/DL
LDLC SERPL CALC-MCNC: 168 MG/DL
NONHDLC SERPL-MCNC: 186 MG/DL
POTASSIUM SERPL-SCNC: 4.5 MMOL/L (ref 3.4–5.3)
PROT SERPL-MCNC: 7.5 G/DL (ref 6.4–8.3)
SODIUM SERPL-SCNC: 141 MMOL/L (ref 135–145)
TRIGL SERPL-MCNC: 92 MG/DL

## 2024-01-15 PROCEDURE — 80053 COMPREHEN METABOLIC PANEL: CPT | Mod: ORL | Performed by: FAMILY MEDICINE

## 2024-01-15 PROCEDURE — 80061 LIPID PANEL: CPT | Mod: ORL | Performed by: FAMILY MEDICINE

## 2024-01-16 NOTE — PROGRESS NOTES
ASSESSMENT and PLAN:     Oni is a 27 yo who had elevated LFTs and Lipids. He's made some major lifestyle changes. Now, doing stair machine and a ski-erg, jogging and lifting weights. Exercises about 6 out of 7 days.   Reviewed labs from Oct 2023 and Jan 2024  Encouraged continued healthy lifestyle  Minimize alcohol  Goal weight of about 195 lbs        Follow-up Return to clinic in Oct. 2024 for Fasting Labs and a repeat annual exam.     North Garay MD  Family Medicine and Sports Medicine  [facility]      Medical assistant intake:  Oni Anton is a 28 year old male who presents to [facility] today for Follow Up (Fasting labs drawn 1/15 after 12/4 visit showing elevated cholesterol and liver enzymes.)      SUBJECTIVE:    Oni is a 28-year-old who is here to discuss his recent labs.  A few months ago he had elevated liver function tests.  Of note, he had been at a wedding and drinking lots of alcohol prior to those tests.  Also, he had elevated cholesterol levels.    In the past 2 months he has made many significant lifestyle changes.  He is now exercising about 6 days/week.  He is trying to eat a healthy diet.  He has lost about 10 pounds of weight.  Today's weight is 213 pounds but he reports that he is 206 at home.  He was 220 in December.  He is feeling more energetic.  No setbacks.    Review Of Systems:       Has otherwise been in usual state of health, e.g.   Cardiovascular: negative  Respiratory: No shortness of breath, dyspnea on exertion, cough, or hemoptysis  Gastrointestinal: negative  Genitourinary: negative    Problem list per EMR:  Patient Active Problem List   Diagnosis    CARDIOVASCULAR SCREENING; LDL GOAL LESS THAN 160    Attention deficit disorder    Major depressive disorder, recurrent episode, moderate (H)    Cervicalgia    Right-sided thoracic back pain    Episodic tension-type headache, not intractable    Right shoulder pain    ANAHY (generalized anxiety disorder)        No current outpatient medications on file.       Allergies   Allergen Reactions    Cats Itching     Eyes, sneezing.        Social:     Unchanged.  He continues to work for [employer]    OBJECTIVE    Vitals: /78 (BP Location: Left arm, Patient Position: Sitting, Cuff Size: Adult Large)   Pulse 71   Temp 97.9  F (36.6  C) (Temporal)   Resp 16   Wt 96.8 kg (213 lb 8 oz)   SpO2 97%   BMI 25.99 kg/m    BMI= Body mass index is 25.99 kg/m .    He appears well and in no distress.    Labs were reviewed showing normal liver function test.  His calcium was essentially normal at 10.1.  It had been slightly elevated at 10.4.    His cholesterol is at 216 which is much lower than it had been at 266.  His LDL cholesterol is still elevated at 168 but it is much lower than it had been at 295.  His HDL remains in the low range at 30.      SEE TOP OF NOTE FOR ASSESSMENT AND PLAN  20 minutes spent on the date of the encounter doing chart review, history and exam, documentation and further activities as noted in the note.     --North Garay MD  [facility], Department of Family Medicine and Community Health

## 2024-01-17 ENCOUNTER — OFFICE VISIT (OUTPATIENT)
Dept: FAMILY MEDICINE | Facility: CLINIC | Age: 29
End: 2024-01-17
Payer: COMMERCIAL

## 2024-01-17 VITALS
BODY MASS INDEX: 25.99 KG/M2 | WEIGHT: 213.5 LBS | OXYGEN SATURATION: 97 % | DIASTOLIC BLOOD PRESSURE: 78 MMHG | SYSTOLIC BLOOD PRESSURE: 112 MMHG | TEMPERATURE: 97.9 F | RESPIRATION RATE: 16 BRPM | HEART RATE: 71 BPM

## 2024-01-17 DIAGNOSIS — E78.00 HYPERCHOLESTEREMIA: Primary | ICD-10-CM

## 2024-01-17 DIAGNOSIS — R74.8 ELEVATED LIVER ENZYMES: ICD-10-CM

## 2024-01-17 NOTE — NURSING NOTE
"28 year old  Chief Complaint   Patient presents with    Follow Up     Fasting labs drawn 1/15 after 12/4 visit showing elevated cholesterol and liver enzymes.       Blood pressure 112/78, pulse 71, temperature 97.9  F (36.6  C), temperature source Temporal, resp. rate 16, weight 96.8 kg (213 lb 8 oz), SpO2 97%. Body mass index is 25.99 kg/m .  Patient Active Problem List   Diagnosis    CARDIOVASCULAR SCREENING; LDL GOAL LESS THAN 160    Attention deficit disorder    Major depressive disorder, recurrent episode, moderate (H)    Cervicalgia    Right-sided thoracic back pain    Episodic tension-type headache, not intractable    Right shoulder pain    ANAHY (generalized anxiety disorder)       Wt Readings from Last 2 Encounters:   01/17/24 96.8 kg (213 lb 8 oz)   12/04/23 100 kg (220 lb 8 oz)     BP Readings from Last 3 Encounters:   01/17/24 112/78   12/04/23 136/85   10/30/23 129/81         No current outpatient medications on file.     No current facility-administered medications for this visit.       Social History     Tobacco Use    Smoking status: Never    Smokeless tobacco: Never   Substance Use Topics    Alcohol use: Yes     Comment: 5-10    Drug use: No       Health Maintenance Due   Topic Date Due    ADVANCE CARE PLANNING  Never done    IPV IMMUNIZATION (5 of 5 - 5-dose series) 10/14/1998    COVID-19 Vaccine (4 - 2023-24 season) 09/01/2023       No results found for: \"PAP\"      January 17, 2024 11:07 AM    "

## 2024-01-17 NOTE — PATIENT INSTRUCTIONS
ASSESSMENT and PLAN:     Oni is a 27 yo who had elevated LFTs and Lipids. He's made some major lifestyle changes. Now, doing stair machine and a ski-erg, jogging and lifting weights. Exercises about 6 out of 7 days.   Reviewed labs from Oct 2023 and Jan 2024  Encouraged continued healthy lifestyle  Minimize alcohol  Goal weight of about 195 lbs        Follow-up Return to clinic in Oct. 2024 for Fasting Labs and a repeat annual exam.     North Garay MD  Family Medicine and Sports Medicine  [facility]

## 2024-10-09 NOTE — PROGRESS NOTES
SUBJECTIVE:                                                    Oni Anton is a 21 year old male who presents to clinic today for the following health issues:    Girlfriend went to a urologist and was diagnosed with ureaplasma bacterial infection and was advised to get himself checked as well    I treated him last year for dysuria with doxycyline and a normal urine culture. He thinks this got better but unsure it if related to anxiety.     Admits to feeling anxiety all of the time. Has been treated for this in the past. Tried about 7 different medications without relief. His mom gave him the number to our [facility] program but he hasn't followed up yet. Reports the problem is, he sees a therapist and never schedules again because he is anxious about it. Isn't sure if he wants to start medication again.     Also with back problems on the right side. Starts in the middle of the back and goes up to the neck pain. It causes nausea and headaches. He's seen a chiropractor. Didn't feel like it helped. It gets worse with feeling anxious.         Problem list and histories reviewed & adjusted, as indicated.  Additional history: as documented    ROS:  C: NEGATIVE for fever, chills, change in weight  E/M: NEGATIVE for ear, mouth and throat problems  R: NEGATIVE for significant cough or SOB  CV: NEGATIVE for chest pain, palpitations or peripheral edema  NEURO: NEGATIVE for weakness, or persistent numbness or tingling  : NEGATIVE: dysuria, frequency, urgency, incontinence  GI: NEGATIVE for incontinence  MS: no swelling or redness in the lower extremities    Patient Active Problem List   Diagnosis     CARDIOVASCULAR SCREENING; LDL GOAL LESS THAN 160     Attention deficit disorder     Major depressive disorder, recurrent episode, moderate (H)     Cervicalgia     Right-sided thoracic back pain     Episodic tension-type headache, not intractable     Right shoulder pain     ANAHY (generalized anxiety disorder)     Past Surgical  Patient is identified as having Diastolic (HFpEF) heart failure that is Chronic. CHF is currently controlled. Latest ECHO performed and demonstrates- Results for orders placed during the hospital encounter of 06/13/22    Echo    Interpretation Summary  · The left ventricle is normal in size with severe concentric hypertrophy and normal systolic function.  · Mild left atrial enlargement.  · The estimated ejection fraction is 55%.  · Grade II left ventricular diastolic dysfunction.  · Normal right ventricular size with normal right ventricular systolic function.  .   monitor clinical status closely. Monitor on telemetry. Patient is off CHF pathway.  Monitor strict Is&Os and daily weights.  Place on fluid restriction of 2 L. Continue to stress to patient importance of self efficacy and  on diet for CHF. Last BNP reviewed- and noted below   Recent Labs   Lab 10/05/24  2048   *     .           "History:   Procedure Laterality Date     C PE TUBE         Social History   Substance Use Topics     Smoking status: Never Smoker     Smokeless tobacco: Never Used     Alcohol use No     Family History   Problem Relation Age of Onset     DIABETES Paternal Grandmother      Hypertension Paternal Grandfather      Lipids Paternal Grandfather      Myocardial Infarction Maternal Grandfather 47     Lipids Father      Depression Father            Problem list, Medication list, Allergies, and Medical/Social/Surgical histories reviewed in [facility] and updated as appropriate.  Labs reviewed in EPIC  BP Readings from Last 3 Encounters:   03/23/17 (!) 140/93   05/04/16 134/84   06/24/15 112/82    Wt Readings from Last 3 Encounters:   03/23/17 203 lb 3.2 oz (92.2 kg)   05/04/16 204 lb (92.5 kg)   06/24/15 180 lb (81.6 kg)                    OBJECTIVE:                                                    BP (!) 140/93  Pulse 87  Temp 97.5  F (36.4  C) (Oral)  Ht 6' 2\" (1.88 m)  Wt 203 lb 3.2 oz (92.2 kg)  SpO2 98%  BMI 26.09 kg/m2 Body mass index is 26.09 kg/(m^2).   GENERAL: alert and oriented, in mild distress  EYES: PERRLA, no injection  HEENT: atraumatic, normocephalic. Nostrils patent, oropharynx hydrated without posterior exudates, edema or erythema.  NECK: normal ROM without spinal tap tenderness  HEART: Normal sinus rhythm, no murmurs rubs or gallops  LUNGS:CTAB  ABDOMEN: Soft non-tender non-distended with normoactive bowel sounds. Negative McBurney's Negative Francois's  EXTREMITIES: Warm well perfused with good ROM and strength. Normal pulses;  BACK: symmetric, no curvature. No spinal or CVA tenderness.positive findings: TTP over right paraspinal muscles with moderate hypertonicity noted.   NECK: ROM intact  SKIN: Warm and dry without lesions       ASSESSMENT/PLAN:                                                        ICD-10-CM    1. ANAHY (generalized anxiety disorder) F41.1    2. Neck pain on right side M54.2 [facility] PT, " "HAND, AND CHIROPRACTIC REFERRAL   3. Dysuria R30.0 UA with Microscopic reflex to Culture     Ureaplasma culture     Discussed restarting anxiety medication, he is unsure whether he wants to or not. He'll plan to mychart me if he decides to. I'll need to do more research on what happened with all the other medications so he'll need to do an evisit for this since he is not comfortable coming back to the clinic. Referral to physical therapy for neck pain from stress. We'll test for ureaplasma as requested. Return to clinic for any new or worsening symptoms or go to ER Urgent care in off hours      Patient Instructions   Go home and think about whether you want to start medication  If you decide to, request a mychart visit  Return to clinic for any new or worsening symptoms or go to ER Urgent care in off hours          Estimated body mass index is 26.09 kg/(m^2) as calculated from the following:    Height as of this encounter: 6' 2\" (1.88 m).    Weight as of this encounter: 203 lb 3.2 oz (92.2 kg).   Weight management plan: See PCP    Deidra Rowley  [facility]      "

## 2024-11-27 ENCOUNTER — HOSPITAL ENCOUNTER (EMERGENCY)
Facility: CLINIC | Age: 29
Discharge: HOME | End: 2024-11-27
Attending: EMERGENCY MEDICINE | Admitting: EMERGENCY MEDICINE
Payer: COMMERCIAL

## 2024-11-27 VITALS
OXYGEN SATURATION: 98 % | HEIGHT: 75 IN | SYSTOLIC BLOOD PRESSURE: 140 MMHG | WEIGHT: 204.4 LBS | RESPIRATION RATE: 18 BRPM | TEMPERATURE: 98.3 F | DIASTOLIC BLOOD PRESSURE: 87 MMHG | BODY MASS INDEX: 25.41 KG/M2 | HEART RATE: 83 BPM

## 2024-11-27 DIAGNOSIS — H93.11 TINNITUS OF RIGHT EAR: ICD-10-CM

## 2024-11-27 DIAGNOSIS — F41.1 GENERALIZED ANXIETY DISORDER WITH PANIC ATTACKS: ICD-10-CM

## 2024-11-27 DIAGNOSIS — F41.0 GENERALIZED ANXIETY DISORDER WITH PANIC ATTACKS: ICD-10-CM

## 2024-11-27 LAB
ANION GAP SERPL CALCULATED.3IONS-SCNC: 12 MMOL/L (ref 7–15)
BASOPHILS # BLD AUTO: 0.1 10E3/UL (ref 0–0.2)
BASOPHILS NFR BLD AUTO: 1 %
BUN SERPL-MCNC: 16.1 MG/DL (ref 6–20)
CALCIUM SERPL-MCNC: 9.8 MG/DL (ref 8.8–10.4)
CHLORIDE SERPL-SCNC: 104 MMOL/L (ref 98–107)
CREAT SERPL-MCNC: 0.97 MG/DL (ref 0.67–1.17)
EGFRCR SERPLBLD CKD-EPI 2021: >90 ML/MIN/1.73M2
EOSINOPHIL # BLD AUTO: 0.3 10E3/UL (ref 0–0.7)
EOSINOPHIL NFR BLD AUTO: 3 %
ERYTHROCYTE [DISTWIDTH] IN BLOOD BY AUTOMATED COUNT: 12.2 % (ref 10–15)
GLUCOSE SERPL-MCNC: 88 MG/DL (ref 70–99)
HCO3 SERPL-SCNC: 24 MMOL/L (ref 22–29)
HCT VFR BLD AUTO: 45.4 % (ref 40–53)
HGB BLD-MCNC: 14.9 G/DL (ref 13.3–17.7)
IMM GRANULOCYTES # BLD: 0 10E3/UL
IMM GRANULOCYTES NFR BLD: 0 %
LYMPHOCYTES # BLD AUTO: 3.7 10E3/UL (ref 0.8–5.3)
LYMPHOCYTES NFR BLD AUTO: 35 %
MCH RBC QN AUTO: 28 PG (ref 26.5–33)
MCHC RBC AUTO-ENTMCNC: 32.8 G/DL (ref 31.5–36.5)
MCV RBC AUTO: 85 FL (ref 78–100)
MONOCYTES # BLD AUTO: 0.7 10E3/UL (ref 0–1.3)
MONOCYTES NFR BLD AUTO: 6 %
NEUTROPHILS # BLD AUTO: 5.8 10E3/UL (ref 1.6–8.3)
NEUTROPHILS NFR BLD AUTO: 55 %
NRBC # BLD AUTO: 0 10E3/UL
NRBC BLD AUTO-RTO: 0 /100
PLATELET # BLD AUTO: 235 10E3/UL (ref 150–450)
POTASSIUM SERPL-SCNC: 4.1 MMOL/L (ref 3.4–5.3)
RBC # BLD AUTO: 5.32 10E6/UL (ref 4.4–5.9)
SODIUM SERPL-SCNC: 140 MMOL/L (ref 135–145)
WBC # BLD AUTO: 10.5 10E3/UL (ref 4–11)

## 2024-11-27 PROCEDURE — 85004 AUTOMATED DIFF WBC COUNT: CPT | Performed by: EMERGENCY MEDICINE

## 2024-11-27 PROCEDURE — 85018 HEMOGLOBIN: CPT | Performed by: EMERGENCY MEDICINE

## 2024-11-27 PROCEDURE — 99284 EMERGENCY DEPT VISIT MOD MDM: CPT | Mod: 25

## 2024-11-27 PROCEDURE — 99284 EMERGENCY DEPT VISIT MOD MDM: CPT | Performed by: NURSE PRACTITIONER

## 2024-11-27 PROCEDURE — 250N000013 HC RX MED GY IP 250 OP 250 PS 637: Performed by: EMERGENCY MEDICINE

## 2024-11-27 PROCEDURE — 82310 ASSAY OF CALCIUM: CPT | Performed by: EMERGENCY MEDICINE

## 2024-11-27 PROCEDURE — 36415 COLL VENOUS BLD VENIPUNCTURE: CPT | Performed by: EMERGENCY MEDICINE

## 2024-11-27 PROCEDURE — 80048 BASIC METABOLIC PNL TOTAL CA: CPT | Performed by: EMERGENCY MEDICINE

## 2024-11-27 RX ORDER — MIRTAZAPINE 15 MG/1
15 TABLET, FILM COATED ORAL AT BEDTIME
Qty: 30 TABLET | Refills: 0 | Status: SHIPPED | OUTPATIENT
Start: 2024-11-27 | End: 2024-11-27

## 2024-11-27 RX ORDER — MIRTAZAPINE 15 MG/1
15 TABLET, FILM COATED ORAL AT BEDTIME
Qty: 30 TABLET | Refills: 0 | Status: SHIPPED | OUTPATIENT
Start: 2024-11-27

## 2024-11-27 RX ORDER — LORAZEPAM 1 MG/1
1 TABLET ORAL EVERY 8 HOURS PRN
Status: DISCONTINUED | OUTPATIENT
Start: 2024-11-27 | End: 2024-11-27 | Stop reason: HOSPADM

## 2024-11-27 RX ORDER — HYDROXYZINE HYDROCHLORIDE 25 MG/1
25-50 TABLET, FILM COATED ORAL 3 TIMES DAILY PRN
Qty: 60 TABLET | Refills: 0 | Status: SHIPPED | OUTPATIENT
Start: 2024-11-27 | End: 2024-11-27

## 2024-11-27 RX ORDER — CYCLOBENZAPRINE HCL 5 MG
5 TABLET ORAL 3 TIMES DAILY PRN
COMMUNITY

## 2024-11-27 RX ORDER — HYDROXYZINE HYDROCHLORIDE 25 MG/1
25-50 TABLET, FILM COATED ORAL 3 TIMES DAILY PRN
Qty: 60 TABLET | Refills: 0 | Status: SHIPPED | OUTPATIENT
Start: 2024-11-27

## 2024-11-27 RX ORDER — DIAZEPAM 5 MG/1
5 TABLET ORAL ONCE
Status: COMPLETED | OUTPATIENT
Start: 2024-11-27 | End: 2024-11-27

## 2024-11-27 RX ADMIN — DIAZEPAM 5 MG: 5 TABLET ORAL at 20:15

## 2024-11-27 ASSESSMENT — COLUMBIA-SUICIDE SEVERITY RATING SCALE - C-SSRS
1. IN THE PAST MONTH, HAVE YOU WISHED YOU WERE DEAD OR WISHED YOU COULD GO TO SLEEP AND NOT WAKE UP?: NO
2. HAVE YOU ACTUALLY HAD ANY THOUGHTS OF KILLING YOURSELF IN THE PAST MONTH?: NO
6. HAVE YOU EVER DONE ANYTHING, STARTED TO DO ANYTHING, OR PREPARED TO DO ANYTHING TO END YOUR LIFE?: NO

## 2024-11-27 ASSESSMENT — ACTIVITIES OF DAILY LIVING (ADL)
ADLS_ACUITY_SCORE: 41

## 2024-11-28 ENCOUNTER — TELEPHONE (OUTPATIENT)
Dept: BEHAVIORAL HEALTH | Facility: CLINIC | Age: 29
End: 2024-11-28
Payer: COMMERCIAL

## 2024-11-28 NOTE — ED NOTES
[facility]  ED to EMPATH Checklist:      Goal for EMPATH: Anxiety management    Current Behavior: Calm    Safety Concerns: None    Legal Hold Status: Voluntary    Medically Cleared by ED provider: Yes    Patient Therapeutically Searched: Therapeutic search by ED staff (strings, belts, shoes, pockets, electronics, etc.)    Belongings: Remain with patient    Independent Ambulation at Baseline: Yes/No: Yes    Participates in Care/Conversation: Yes/No: Yes    Patient Informed about EMPATH: Yes/No: Yes    DEC: Ordered and completed    Patient Ready to be Transferred to EMPATH? Yes/No: Yes

## 2024-11-28 NOTE — DISCHARGE INSTRUCTIONS
Scheduled Appointment(s):    Type: Therapy - Initial (In-Person)  Date: Monday, 12/2/2024    Time: 10:00 am - 11:00 am  Provider: Jean Claude LOFTON  LIC  Location: [facility], [address] [place] [address], [address] [address]  Phone: [phone]    --------------------------------------    Type: Medication Mgmt - Initial (In-Person)  Date: Tuesday, 12/3/2024    Time: 10:00 am - 11:00 am  Provider: Carol Holland  MSN  CNP,PMHNP,RN  Location: [address] [address]  Phone: [phone]  Patient instructions: All paperwork to be completed online prior to appointment, or appointment may be cancelled. Paperwork link will be sent via email.

## 2024-11-28 NOTE — ED NOTES
"29 year old male with history of tinnitus in right ear (since last Thursday) received from ED due to panic attacks. Reports last week woke up at 2 am and was experiencing tinnitus and potential TMJ. Since then, anxiety has been worse. Increase panic attacks. Describes them as his doherty tensing up and \"shaking\". These episodes come and go throughout the day and last about 2-3 minutes. Patient is aware when it is happening. No hx of seizures or withdrawal of any kind. Otherwise states there are no psychosocial stressors in his life. These panic attacks are effecting his appetite and sleep. States he tried a few SSRIs in the past when he was younger, but has been off medication for years. Cannot recall medications. Currently is only taking Flexeril that was prescribed by the ENT specialist he saw yesterday (11/26) due to these ongoing concerns. They recommended following up with PT. Appointment for this is Dec. 31st. Denies SI/HI and hallucinations. Presents with a flat affect, anxious in mood. Willing to discuss medications and additional resources. Hoping to discharge to home tonight with his girlfriend Casandra.    Nursing and risk assessments completed. Assessments reviewed with [facility] and physician. Admission information reviewed with patient. Patient given a tour of [facility] and instructions on using the facility. Questions regarding [facility] addressed. Pt safety search completed.   "

## 2024-11-28 NOTE — CONSULTS
Diagnostic Evaluation Consultation  Crisis Assessment    Patient Name: Oni Anton  Age:  29 year old  Legal Sex: male  Gender Identity: male  Pronouns:   Race: White  Ethnicity: Not  or   Language: English      Patient was assessed: In person   Crisis Assessment Start Date: 11/27/24  Crisis Assessment Start Time: 1910  Crisis Assessment Stop Time: 1940  Patient location: [facility] EMERGENCY DEPT                             ED16    Referral Data and Chief Complaint  Oni Anton presents to the ED with family/friends. Patient is presenting to the ED for the following concerns: Anxiety, Depression.   Factors that make the mental health crisis life threatening or complex are:  Ongoing tinnitus either causing or exacerbating ongoing, unremitting panic attacks..      Informed Consent and Assessment Methods  Explained the crisis assessment process, including applicable information disclosures and limits to confidentiality, assessed understanding of the process, and obtained consent to proceed with the assessment.  Assessment methods included conducting a formal interview with patient, review of medical records, collaboration with medical staff, and obtaining relevant collateral information from family and community providers when available.  : done     Patient response to interventions: acceptance expressed, verbalizes understanding  Coping skills were attempted to reduce the crisis:  Coming to the ED for assistance.     History of the Crisis   Patient presents to D ED  accompanied by his long time girlfriend, for concerns of debilitating panic attacks currently ongoing. Patient has prior diagnosis of MDD, ANAHY, and ADD. Patient has been managed well for years and woke up six days ago with tinnitus, and began having debilitating panic attacks of the freeze up type. Patient endorses multiple panic attacks in the past 24 hours. Patient is previously on medications for ANAHY f8-10  years ago for similar. Patient says his panic attacks are now accompanied by tinnitus that when bad can look like seizures. He is experiencing bilateral tinnitus with nausea. Patient endorses a poor appetite due to symptoms and nausea, and poor sleep that for all intents is no sleep for the past six days, and has issues getting to sleep trough his symptoms. Patient denies any SI/SIB/HI/AH/VH but says he is in despair this week due to his panic. He does have a prior self harm/suicide attempt at age 17, drinking floor cleaned while drunk. Patient did not present with this and slept it off. No other incidents or attempts. Patient was clearly experiencing panic in assessment that would come and go in intensity. Patient is a  and is employed full time and is taking time from work to address this issue as it has affected him at work. Patient has a PCP in [place]. He currently has no medication provider or therapist, and the sole medication he is currently prescribed, Flexeril 5mg, is prescribed by his PCP. The last time patient had a therapist was nine years ago as a high school student. Patient could not identify any overt triggers or stressors and denies any traumas. Patient is labile in assessment, struggling to manage symptoms.    Brief Psychosocial History  Family:  Lives with Significant Other, Children no  Support System:  Significant Other  Employment Status:  employed full-time  Source of Income:  salary/wages  Financial Environmental Concerns:  none  Current Hobbies:  social media/computer activities  Barriers in Personal Life:  emotional concerns    Significant Clinical History  Current Anxiety Symptoms:  panic attack, racing thoughts, excessive worry, anxious  Current Depression/Trauma:  sense of doom, difficulty concentrating, crying or feels like crying, irritable  Current Somatic Symptoms:  racing thoughts, excessive worry, anxious  Current Psychosis/Thought Disturbance:   (None  noted.)  Current Eating Symptoms:  loss of appetite  Chemical Use History:  Alcohol: None  Benzodiazepines: None  Opiates: None  Cocaine: None  Marijuana: None  Other Use: None  Withdrawal Symptoms:  (None noted.)  Addictions:  (None noted.)   Past diagnosis:  ADHD, Anxiety Disorder, Depression  Family history:  ADHD, Anxiety Disorder, Depression  Past treatment:  Individual therapy, Primary Care, Psychiatric Medication Management  Details of most recent treatment:  Ongoing med mgmt with Flexeril for TMJ.  Other relevant history:  None noted.       Collateral Information  Is there collateral information: No (Significant other Casandra in assessment. Concurs with patient narrative.)         Risk Assessment  [place] Suicide Severity Rating Scale Full Clinical Version:  Suicidal Ideation  Q1 Wish to be Dead (Lifetime): Yes  Q2 Non-Specific Active Suicidal Thoughts (Lifetime): Yes  3. Active Suicidal Ideation with any Methods (Not Plan) Without Intent to Act (Lifetime): No  Q4 Active Suicidal Ideation with Some Intent to Act, Without Specific Plan (Lifetime): No  Q5 Active Suicidal Ideation with Specific Plan and Intent (Lifetime): No  Q6 Suicide Behavior (Lifetime): yes  Intensity of Ideation (Lifetime)  Most Severe Ideation Rating (Lifetime): 4  Description of Most Severe Ideation (Lifetime): Attempt at 17 years old drinking  while drunk.  Suicidal Behavior (Lifetime)  Actual Attempt (Lifetime): Yes  Total Number of Actual Attempts (Lifetime): 1  Actual Attempt Description (Lifetime): Attempt at 17 years old drinking  while drunk.  Has subject engaged in non-suicidal self-injurious behavior? (Lifetime): No  Interrupted Attempts (Lifetime): No  Aborted or Self-Interrupted Attempt (Lifetime): No  Preparatory Acts or Behavior (Lifetime): No    [place] Suicide Severity Rating Scale Recent:   Suicidal Ideation (Recent)  Q1 Wished to be Dead (Past Month): no  Q2 Suicidal Thoughts (Past Month):  no  Level of Risk per Screen: no risks indicated  Intensity of Ideation (Recent)  Description of Most Severe Ideation (Past 1 Month): None noted.  Controllability (Past 1 Month): Does not attempt to control thoughts  Deterrents (Past 1 Month): Does not apply  Reasons for Ideation (Past 1 Month): Does not apply  Suicidal Behavior (Recent)  Actual Attempt (Past 3 Months): No  Total Number of Actual Attempts (Past 3 Months): 0  Actual Attempt Description (Past 3 Months): None noted.  Has subject engaged in non-suicidal self-injurious behavior? (Past 3 Months): No  Interrupted Attempts (Past 3 Months): No  Total Number of Interrupted Attempts (Past 3 Months): 0  Interrupted Attempt Description (Past 3 Months): None noted.  Aborted or Self-Interrupted Attempt (Past 3 Months): No  Total Number of Aborted or Self-Interrupted Attempts (Past 3 Months): 0  Aborted or Self-Interrupted Attempt Description (Past 3 Months): None noted.  Preparatory Acts or Behavior (Past 3 Months): No  Total Number of Preparatory Acts (Past 3 Months): 0  Preparatory Acts or Behavior Description (Past 3 Months): None noted.    Environmental or Psychosocial Events: work or task failure, other (see comment) (Onset of Tinnitus 6 days ago at 0200)  Protective Factors: Protective Factors: strong bond to family unit, community support, or employment, intact marriage or domestic partnership, lives in a responsibly safe and stable environment, good treatment engagement, sense of importance of health and wellness, able to access care without barriers, supportive ongoing medical and mental health care relationships, help seeking, good impulse control, good problem-solving, coping, and conflict resolution skills, sense of belonging, sense of self-efficacy and/or positive self-esteem, optimistic outlook - identification of future goals, constructive use of leisure time, enjoyable activities, resilience, reality testing ability    Does the patient have  thoughts of harming others? Feels Like Hurting Others: no  Previous Attempt to Hurt Others: no  Current presentation:  (Calm, cooperative, conversational, labile.)  Is the patient engaging in sexually inappropriate behavior?: no    Is the patient engaging in sexually inappropriate behavior?  no        Mental Status Exam   Affect: Labile  Appearance: Appropriate  Attention Span/Concentration: Attentive  Eye Contact: Variable    Fund of Knowledge: Appropriate   Language /Speech Content: Fluent  Language /Speech Volume: Normal  Language /Speech Rate/Productions: Normal  Recent Memory: Intact  Remote Memory: Intact  Mood: Sad  Orientation to Person: Yes   Orientation to Place: Yes  Orientation to Time of Day: Yes  Orientation to Date: Yes     Situation (Do they understand why they are here?): Yes  Psychomotor Behavior: Normal  Thought Content: Clear  Thought Form: Intact    Medication  Psychotropic medications:   Medication Orders - Psychiatric (From admission, onward)      Start     Dose/Rate Route Frequency Ordered Stop    11/27/24 1915  diazepam (VALIUM) tablet 5 mg         5 mg Oral ONCE 11/27/24 1913 11/27/24 1913  LORazepam (ATIVAN) tablet 1 mg         1 mg Oral EVERY 8 HOURS PRN 11/27/24 1913               Current Care Team  Patient Care Team:  [place] MD as PCP - General (Family Medicine)  Martina Mejias PA-C as Physician Assistant (Physician Assistant)  [place] MD as Assigned PCP  Brenna Batista AuD as Audiologist (Audiology)  Kings Galvez APRN CNP as Nurse Practitioner (Otolaryngology)    Diagnosis  Patient Active Problem List   Diagnosis Code    CARDIOVASCULAR SCREENING; LDL GOAL LESS THAN 160 Z13.6    Attention deficit disorder F98.8    Major depressive disorder, recurrent episode, moderate (H) F33.1    Cervicalgia M54.2    Right-sided thoracic back pain M54.6    Episodic tension-type headache, not intractable G44.219    Right shoulder pain M25.511    ANAHY  (generalized anxiety disorder) F41.1       Primary Problem This Admission  Active Hospital Problems    ANAHY (generalized anxiety disorder)      *Major depressive disorder, recurrent episode, moderate (H)      Attention deficit disorder        Clinical Summary and Substantiation of Recommendations   Patient denies any SI/SIB/HI/AH/VH. He is struggling with Tinnitus that has exacerbated his ANAHY into full blown panic attacks resulting in inability to sleep or eat due to nausea and anxiety. Patient is in need of stabilization, medication management going forward, and could benefit from 1:1 talk therapy. Set for [facility] following medical clearance and the completion of an MRI. Patient is advocating discharge and is not holdable at this time. Set up with med mgmt and therapy.    Patient coping skills attempted to reduce the crisis:  Coming to the ED for assistance.    Disposition  Recommended disposition: Discharge       Reviewed case and recommendations with attending provider. Attending Name: Dr GARRETT Krause MD       Attending concurs with disposition: yes       Patient and/or validated legal guardian concurs with disposition:   yes       Final disposition:  Discharge    Legal status on admission: Voluntary/Patient has signed consent for treatment    Assessment Details   Total duration spent with the patient: 30 min     CPT code(s) utilized: 34549 - Psychotherapy for Crisis - 60 (30-74*) min    Vamsi Santana MA LPC Psychotherapist  [facility] - Triage & Transition Services  Callback: [phone]

## 2024-11-28 NOTE — ED TRIAGE NOTES
Multiple panic attacks over the last 24 hours. Was on medication for panic attacks 8-10 years ago. Patient states that the panic attacks are accompanies by tinnitus.   Unsure of what prompted this episode.     When episode happens, patient appears to be having a seizure.

## 2024-11-28 NOTE — ED NOTES
Patient agreeable to discharge plan. Discharge instructions reviewed with patient including follow-up care plan. Medications: Remeron and Hydroxyzine sent to pharmacy down the street. Reviewed safety plan and outpatient resources. Denies SI and HI. All belongings that were brought into the hospital have been returned to patient. Escorted off the unit at 2130 accompanied by [facility] staff. Discharged to home via girlfriend.

## 2024-11-28 NOTE — ED PROVIDER NOTES
[facility] Unit - Psychiatric Consultation  [facility] Emergency Department    Oni Anton MRN: [id number]   Age: 29 year old YOB: 1995     History     Chief Complaint   Patient presents with    Panic Attack     HPI  Oni Anton is a 29 year old male with history notable for generalized anxiety disorder, major depressive disorder, and ADHD.  Patient presented to the emergency department for evaluation of worsening anxiety and panic symptoms along with new onset tinnitus.  Patient was medically evaluated in the emergency department and determined to be medically stable for transfer to [facility] for further psychiatric assessment.  Patient is nearing 3.5 hours in emergency care.  All labs and imaging reviewed.    Here at [facility], patient reports that 6 days ago, he woke up around 2 AM with significant tinnitus.  Recalls becoming more anxious following this, worried that there may be something stuck in his ear or some other medical issue occurring.  Following this, patient began to feel increasingly anxious, experiencing a more difficult time sleeping.  He began to experience more distressing thoughts, worrying that the tinnitus will never go away, or that his anxiety will continue to get worse and worse.  Reports he went to the gym a couple of days ago, and felt better for a brief period of time, with no tinnitus, but then symptoms returned shortly thereafter.  Reports the panic attacks have become more frequent.  He states 1 evening, the panic symptoms were occurring every 20 minutes or so.  Reports feeling as though his body was extremely tense and he cannot relax his muscles.  Reports he would flop around on the floor like a fish.  States the panic symptoms persist until he wears out.  Patient endorses a more difficult time focusing at work recently as a result of his anxiety.  In addition, he has been feeling more nauseous, and becomes nauseous upon eating.  This has led to a decreased appetite  "and less interest in food.  Patient feels as though these symptoms came up on abruptly.  In[place]alls as though his anxiety was under good control for several years.  He has tried various antidepressants in the past, including sertraline, fluoxetine, citalopram, vilazodone, Fetzima, bupropion, buspirone.  Patient reports that many of these medications led him to feel \"zombie-like\" and does not feel that they improved his quality of life.  He is agreeable to another medication trial, and we discussed mirtazapine, to which he is agreeable.  He denies any suicidal or homicidal thinking.  There is no evidence for psychosis or mitch.  He is seeking discharge home and agreeable to outpatient follow-up.  Encouraged to reach out for help or return to the emergency department with any worsening symptoms.      Past Medical History  Past Medical History:   Diagnosis Date    Depression      Past Surgical History:   Procedure Laterality Date    ZZC PE TUBE       cyclobenzaprine (FLEXERIL) 5 MG tablet  hydrOXYzine HCl (ATARAX) 25 MG tablet  mirtazapine (REMERON) 15 MG tablet      Allergies   Allergen Reactions    Cats Itching     Eyes, sneezing.     Family History  Family History   Problem Relation Age of Onset    Diabetes Paternal Grandmother     Hypertension Paternal Grandfather     Lipids Paternal Grandfather     Myocardial Infarction Maternal Grandfather 47    Lipids Father     Depression Father     Attention Deficit Disorder Father      Social History   Social History     Tobacco Use    Smoking status: Never    Smokeless tobacco: Never   Substance Use Topics    Alcohol use: Yes     Comment: 5-10    Drug use: No          Review of Systems  A medically appropriate review of systems was performed with pertinent positives and negatives noted in the HPI, and all other systems negative.    Physical Examination   BP: (!) 138/103  Pulse: 77  Temp: 97.7  F (36.5  C)  Resp: 18  Height: 190.5 cm (6' 3\")  Weight: 95.3 kg (210 lb)  SpO2: 98 " %    Physical Exam  General: Appears stated age.   Neuro: Alert and fully oriented. Extremities appear to demonstrate normal strength on visual inspection.   Integumentary/Skin: no rash visualized, normal color    Psychiatric Examination   Appearance: awake, alert, adequately groomed, appeared as age stated, and casually dressed  Attitude:  cooperative  Eye Contact:  good  Mood:  anxious  Affect:  mood congruent and intensity is normal  Speech:  clear, coherent  Psychomotor Behavior:  no evidence of tardive dyskinesia, dystonia, or tics  Thought Process:  linear  Associations:  no loose associations  Thought Content:  no evidence of suicidal ideation or homicidal ideation and no evidence of psychotic thought  Insight:  fair  Judgement:  intact  Oriented to:  time, person, and place  Attention Span and Concentration:  fair  Recent and Remote Memory:  intact  Language: able to name/identify objects without impairment  Fund of Knowledge: intact with awareness of current and past events    ED Course        Labs Ordered and Resulted from Time of ED Arrival to Time of ED Departure   BASIC METABOLIC PANEL - Normal       Result Value    Sodium 140      Potassium 4.1      Chloride 104      Carbon Dioxide (CO2) 24      Anion Gap 12      Urea Nitrogen 16.1      Creatinine 0.97      GFR Estimate >90      Calcium 9.8      Glucose 88     CBC WITH PLATELETS AND DIFFERENTIAL    WBC Count 10.5      RBC Count 5.32      Hemoglobin 14.9      Hematocrit 45.4      MCV 85      MCH 28.0      MCHC 32.8      RDW 12.2      Platelet Count 235      % Neutrophils 55      % Lymphocytes 35      % Monocytes 6      % Eosinophils 3      % Basophils 1      % Immature Granulocytes 0      NRBCs per 100 WBC 0      Absolute Neutrophils 5.8      Absolute Lymphocytes 3.7      Absolute Monocytes 0.7      Absolute Eosinophils 0.3      Absolute Basophils 0.1      Absolute Immature Granulocytes 0.0      Absolute NRBCs 0.0         Assessments & Plan (with  Medical Decision Making)   Patient presenting with heightened anxiety symptoms leading to panic attacks and insomnia. Nursing notes reviewed noting no acute issues.     I have reviewed the assessment completed by the LM.     Preliminary diagnosis:    ICD-10-CM    1. Tinnitus of right ear  H93.11       2. Generalized anxiety disorder with panic attacks  F41.1     F41.0            Treatment Plan:  -Start mirtazapine 15 mg nightly for treatment of anxiety symptoms and sleep.  Counseled patient on side effects of increased appetite and sedation.  -Hydroxyzine 25 to 50 mg 3 times daily as needed for acute anxiety or panic symptoms  -Patient will be provided with outpatient follow-up appointments for psychiatric medication management as well as individual psychotherapy  -Reviewed BMP and CBC, within normal limits.  Head CT with no acute intracranial abnormalities.  -May consider checking TSH in the outpatient setting  -Patient to be discharged home    Plan of care discussed with RN and LMHP.     After a period of working with the treatment team on the [facility] unit, the patient's mental state improved to allow a safe transition to outpatient care. After counseling on the diagnosis, work-up, and treatment plan, the patient was discharged. Close follow-up with a psychiatrist and/or therapist was recommended and community psychiatric resources were provided. Patient is to return to the ED if any urgent or potentially life-threatening concerns.     At the time of discharge, the patient's acute suicide risk was determined to be low due to the following factors: Reduction in the intensity of mood/anxiety symptoms that preceded the admission, denial of suicidal thoughts, denies feeling helpless or hopeless, not currently under the influence of alcohol or illicit substances, denies experiencing command hallucinations, no immediate access to firearms. The patient's acute risk could be higher if noncompliant with their treatment  plan, medications, follow-up appointments or using illicit substances or alcohol. Protective factors include: social supports, stable housing, employment      --  Fletcher Frohreich, CNP   [facility] EMERGENCY DEPT  [facility] Unit        Fletcher Tracey CNP  11/27/24 [id number]

## 2024-11-28 NOTE — PLAN OF CARE
Oni LANG Anton  November 27, 2024  Plan of Care Hand-off Note     Patient Care Path: observation    Plan for Care:   Patient denies any SI/SIB/HI/AH/VH. He is struggling with Tinnitus that has exacerbated his ANAHY into full blown panic attacks resulting in inability to sleep or eat due to nausea and anxiety. Patient is in need of stabilization, medication management going forward, and could benefit from 1:1 talk therapy. Set for [facility] following medical clearance and the completion of an MRI.    Identified Goals and Safety Issues: Stabilize patient from anxiety state. Set patient up with emdication management, and a therapist if desired.    Overview:  Shanthi Anton, rubi, [phone]            Legal Status: Legal Status at Admission: Voluntary/Patient has signed consent for treatment    Psychiatry Consult: Yes       Updated  MD and RN regarding plan of care.           Vamsi Santana MA LPC

## 2024-11-28 NOTE — ED PROVIDER NOTES
"  Emergency Department Note      History of Present Illness     Chief Complaint   Panic Attack      HPI   Oni Anton is a 29 year old male presenting with his significant other with chief complaint of panic attacks.  He states that approximately 5 days ago he developed ringing in his right ear, he states that he saw an otolaryngologist for this within the last 48 hours and had testing performed which was unremarkable.  He states that since the ringing has developed he has been developing significant panic reactions which he has had in the past.  He states he has an underlying anxiety and develops episodes when he becomes overwhelmed with the anxiety and starts shaking but is responsive during this time.  He denies any suicidal or homicidal ideation.  He has had a therapist and psychiatrist in the past but is currently not working with anyone, he is not on any current medications for his mental health.    Independent Historian   Significant other supplements the history as detailed above.    Review of External Notes   Telemedicine note from earlier today was reviewed    Past Medical History     Medical History and Problem List   Past Medical History:   Diagnosis Date    Depression    attention deficit disorder  Generalized anxiety disorder    Medications   No current outpatient medications on file.      Surgical History   Past Surgical History:   Procedure Laterality Date    ZZC PE TUBE         Physical Exam     Patient Vitals for the past 24 hrs:   BP Temp Temp src Pulse Resp Height Weight   11/27/24 1845 (!) 138/103 97.4  F (36.3  C) Temporal 77 18 1.905 m (6' 3\") 95.3 kg (210 lb)   11/27/24 1843 -- 97.7  F (36.5  C) Temporal -- -- -- --     Physical Exam  General: Alert, interactive   Head:  Scalp is atraumatic  Eyes:  The pupils are equal, round, and reactive to light    EOM's intact    No scleral icterus  ENT:      Nose:  The external nose is normal  Ears:  External ears are normal      Neck:  Normal range " of motion.      There is no rigidity.    Trachea is in the midline         CV:  Regular rate and rhythm    No murmur   Resp:  Breath sounds are clear bilaterally    Non-labored, no retractions or accessory muscle use   MS:  Normal strength in all 4 extremities  Skin:  Warm and dry, No rash or lesions noted.  Neuro:   Strength 5/5 x4.       GCS: 15  Psych: Awake. Alert.  Anxious denies suicidal or homicidal ideation affect.      Appropriate interactions.    Diagnostics     Lab Results   Labs Ordered and Resulted from Time of ED Arrival to Time of ED Departure   CBC WITH PLATELETS AND DIFFERENTIAL       Result Value    WBC Count 10.5      RBC Count 5.32      Hemoglobin 14.9      Hematocrit 45.4      MCV 85      MCH 28.0      MCHC 32.8      RDW 12.2      Platelet Count 235      % Neutrophils 55      % Lymphocytes 35      % Monocytes 6      % Eosinophils 3      % Basophils 1      % Immature Granulocytes 0      NRBCs per 100 WBC 0      Absolute Neutrophils 5.8      Absolute Lymphocytes 3.7      Absolute Monocytes 0.7      Absolute Eosinophils 0.3      Absolute Basophils 0.1      Absolute Immature Granulocytes 0.0      Absolute NRBCs 0.0     BASIC METABOLIC PANEL       Imaging   CT Head w/o Contrast   Final Result   IMPRESSION:   1.  No acute intracranial findings.        Independent Interpretation   CT Head: No intracranial hemorrhage or midline shift.    ED Course      Medications Administered   Medications   LORazepam (ATIVAN) tablet 1 mg (has no administration in time range)   melatonin tablet 3 mg (has no administration in time range)   diazepam (VALIUM) tablet 5 mg (5 mg Oral $Given 11/27/24 2015)       Procedures   Procedures     Discussion of Management   I discussed case with the mental health  who was in agreement with transition to the empath unit for further evaluation and treatment    ED Course        Additional Documentation  Social Determinants of Health: Stress/Adjustment Disorders     Medical  Decision Making / Diagnosis     CMS Diagnoses: None    MIPS       None    [facility]   Oni Anton is a 29 year old male presenting with anxiety and panic attacks.  He was evaluated by the mental health  and we both feel that the patient would benefit from further stabilization in our empath unit.  Given his tinnitus laboratory workup and CT imaging were undertaken thankfully returning is unremarkable.  He has had an otolaryngology workup since his symptoms have developed which was largely unremarkable.  I do not believe he needs any further workup for this.  He was in agreement with further evaluation in the empath unit for stabilization of his anxiety.    Disposition   The patient was transferred to [facility].     Diagnosis     ICD-10-CM    1. Anxiety attack  F41.0       2. Tinnitus of right ear  H93.11              Darryn Krause MD      Trigger, Darryn Méndez MD  11/27/24 2036

## 2024-12-22 ENCOUNTER — HEALTH MAINTENANCE LETTER (OUTPATIENT)
Age: 29
End: 2024-12-22